# Patient Record
Sex: MALE | Race: WHITE | URBAN - METROPOLITAN AREA
[De-identification: names, ages, dates, MRNs, and addresses within clinical notes are randomized per-mention and may not be internally consistent; named-entity substitution may affect disease eponyms.]

---

## 2017-08-25 ENCOUNTER — TRANSFERRED RECORDS (OUTPATIENT)
Dept: HEALTH INFORMATION MANAGEMENT | Facility: CLINIC | Age: 82
End: 2017-08-25

## 2017-08-25 ENCOUNTER — TELEPHONE (OUTPATIENT)
Dept: OPHTHALMOLOGY | Facility: CLINIC | Age: 82
End: 2017-08-25

## 2017-08-25 NOTE — TELEPHONE ENCOUNTER
Dr. Vann referral for possible gas bubble/TPA procedure for macular hemorrhage in next 1-2 weeks  Notes been faxed    Pt visiting in [place] today, but lives closer to [place] now  Able to schedule an evaluation next Thursday with dr. Bourgeois    Left message to confirm appt with direct triage number  Leopoldo Blanchard RN 12:27 PM 08/25/17

## 2017-08-25 NOTE — TELEPHONE ENCOUNTER
----- Message from Denver Roberts sent at 8/25/2017  9:46 AM CDT -----  Regarding: macular hemmorrhage  Carin smith pt was referred by Dr Maverick Vann to Dr FIONA Bourgeois for macular hemorrhage in 1-2 weeks. Can you call Dora at [phone] or just call the pt to sched and then let dora know the date/time.    Thanks  Denver

## 2017-08-31 ENCOUNTER — OFFICE VISIT (OUTPATIENT)
Dept: OPHTHALMOLOGY | Facility: CLINIC | Age: 82
End: 2017-08-31
Attending: OPHTHALMOLOGY
Payer: MEDICARE

## 2017-08-31 DIAGNOSIS — H35.3210 EXUDATIVE AGE-RELATED MACULAR DEGENERATION, RIGHT EYE, STAGE UNSPECIFIED (H): ICD-10-CM

## 2017-08-31 DIAGNOSIS — H35.61 RETINAL HEMORRHAGE, RIGHT: Primary | ICD-10-CM

## 2017-08-31 PROCEDURE — 99215 OFFICE O/P EST HI 40 MIN: CPT | Mod: ZF

## 2017-08-31 PROCEDURE — 92134 CPTRZ OPH DX IMG PST SGM RTA: CPT | Mod: ZF | Performed by: OPHTHALMOLOGY

## 2017-08-31 PROCEDURE — 92250 FUNDUS PHOTOGRAPHY W/I&R: CPT | Mod: ZF | Performed by: OPHTHALMOLOGY

## 2017-08-31 PROCEDURE — 92240 ICG ANGIOGRAPHY I&R UNI/BI: CPT | Mod: ZF | Performed by: OPHTHALMOLOGY

## 2017-08-31 PROCEDURE — 92235 FLUORESCEIN ANGRPH MLTIFRAME: CPT | Mod: ZF | Performed by: OPHTHALMOLOGY

## 2017-08-31 RX ORDER — HYDROCHLOROTHIAZIDE 25 MG/1
25 TABLET ORAL DAILY
COMMUNITY

## 2017-08-31 ASSESSMENT — TONOMETRY
OS_IOP_MMHG: 17
IOP_METHOD: TONOPEN
OD_IOP_MMHG: 13

## 2017-08-31 ASSESSMENT — REFRACTION_MANIFEST
OS_ADD: +2.75
OD_AXIS: 003
OD_SPHERE: PLANO
OS_SPHERE: PLANO
OD_CYLINDER: +0.50
OS_CYLINDER: SPHERE
OD_ADD: +2.75

## 2017-08-31 ASSESSMENT — CONF VISUAL FIELD
OD_NORMAL: 1
OS_NORMAL: 1

## 2017-08-31 ASSESSMENT — SLIT LAMP EXAM - LIDS
COMMENTS: NORMAL
COMMENTS: NORMAL

## 2017-08-31 ASSESSMENT — VISUAL ACUITY
METHOD: SNELLEN - LINEAR
CORRECTION_TYPE: GLASSES
OS_CC: 20/30
OS_CC+: -2/+2

## 2017-08-31 ASSESSMENT — EXTERNAL EXAM - RIGHT EYE: OD_EXAM: NORMAL

## 2017-08-31 ASSESSMENT — CUP TO DISC RATIO
OS_RATIO: 0.7
OD_RATIO: 0.7

## 2017-08-31 ASSESSMENT — EXTERNAL EXAM - LEFT EYE: OS_EXAM: NORMAL

## 2017-08-31 NOTE — MR AVS SNAPSHOT
After Visit Summary   8/31/2017    Aurelio Graf    MRN: [id number]           Patient Information     Date Of Birth          9/27/1934        Visit Information        Provider Department      8/31/2017 8:15 AM Kanika Bourgeois MD [facility]        Today's Diagnoses     Retinal hemorrhage, right    -  1    Exudative age-related macular degeneration, right eye, stage unspecified (H)           Follow-ups after your visit        Your next 10 appointments already scheduled     Sep 05, 2017   Procedure with Kanika Bourgeois MD   [facility] (--)    [address]  [facility] [address]   [phone]            Sep 06, 2017  1:00 PM CDT   Post-Op with Sathya Nj MD   Eye Clinic ([facility])    [address]  [address]  [facility] [address] [phone]            Sep 14, 2017  1:15 PM CDT   Post-Op with Kanika Bourgeois MD   Eye Clinic ([facility])    [address]  [address]  [facility] [address] [phone]              Who to contact     Please call your clinic at [phone] to:    Ask questions about your health    Make or cancel appointments    Discuss your medicines    Learn about your test results    Speak to your doctor   If you have compliments or concerns about an experience at your clinic, or if you wish to file a complaint, please contact [facility] Patient Relations at [phone] or email us at [email or web address]         Additional Information About Your Visit        Care EveryWhere ID     This is your Care EveryWhere ID. This could be used by other organizations to access your [facility] medical records  [id number]         Blood Pressure from Last 3 Encounters:   No data found for BP    Weight from Last 3 Encounters:   No data found for Wt              We Performed the Following     Fluorescein  Angiography OU (both eyes)     Fundus Autofluorescence Image (FAF) OU (both eyes)     Fundus Photos OU (both eyes)     ICG Angiography OU (both eyes)     OCT Retina Spectralis OU (both eyes)     Valeri-Operative Worksheet (Retina)        Primary Care Provider Office Phone # Fax #    Anibal Neri [phone]       [address] [address]        Equal Access to Services     SMOOTH BADILLO : Hadii aad ku hadasho Soomaali, waaxda luqadaha, qaybta kaalmada adeegyada, waxay idiin hayaan adeeg khmiguesh la'christophern . So [facility] [phone].    ATENCIÓN: Si habla espbaljit, tiene a pena disposición servicios gratuitos de asistencia lingüística. Llame al [phone].    We comply with applicable federal civil rights laws and [place] laws. We do not discriminate on the basis of race, color, national origin, age, disability sex, sexual orientation or gender identity.            Thank you!     Thank you for choosing EYE CLINIC  for your care. Our goal is always to provide you with excellent care. Hearing back from our patients is one way we can continue to improve our services. Please take a few minutes to complete the written survey that you may receive in the mail after your visit with us. Thank you!             Your Updated Medication List - Protect others around you: Learn how to safely use, store and throw away your medicines at www.disposemymeds.org.          This list is accurate as of: 8/31/17 10:33 AM.  Always use your most recent med list.                   Brand Name Dispense Instructions for use Diagnosis    AMLODIPINE BESYLATE PO      Take 5 mg by mouth At Bedtime        ASPIRIN PO      Take 81 mg by mouth daily        ATORVASTATIN CALCIUM PO      Take 20 mg by mouth At Bedtime        hydrochlorothiazide 25 MG tablet    HYDRODIURIL     Take 25 mg by mouth daily        PRESERVISION AREDS PO      Take 1 capsule by mouth 2 times daily

## 2017-08-31 NOTE — PROGRESS NOTES
CC -   Retinal hemorrhage    INTERVAL HISTORY - Initial visit    HPI -   Aurelio Graf is a  82 year old year-old patient presenting from Dr. Maverick Vann for foveal hemorrhage. He has a history of ARMD on AREDS, cataracts. He was at his cabin near [place] and last Tuesday when he noticed decreased vision while watching TV. On Wednesday, he covered his left eye and noticed very blurry vision in his right eye. He was seen in the ED in [facility] where a stroke workup was negative. He was seen by Belkis Garcia and Edwar in [place].     PAST OCULAR SURGERY  None    RETINAL IMAGING:  OCT 8/31/17  OD - large subretinal fluid, heme and sub rpe fluid, heme, drusen  OS - many scattered drusen,no srf/irf    FA 8/31/17  OD - large area of blocking involving fovea and extending beyond inferior arcade, pinpoint area of leakage inferiorly  OS - normal AV and choroidal filling. early hypofluorescence of drusen with late staining    ICG 8/31/17  OD - blockage large area of blocking involving fovea and extending beyond inferior arcade, pinpoint area of leakage inferiorly  OS -. hypofluorescence of drusen     Color optos photos 8/31/17  OD - large subretinal macular hemorrhage involving fovea and inferiorly extending beyond arcade  OS - drusen throughout macula, nasal PPA, temporal pigmentation in periphery      ASSESSMENT & PLAN  1) Retinal hemorrhage, right eye   -status post Avastin x 1 8/25/17 by Dr. 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 Patient agreed to proceed with surgery.    2) ARMD, both eyes   -currently on AREDS   -healthy diet, no smoking   -Amsler grid    3) Cataracts bilateral   -observe for now   -likely visually significant    4) pseudoexfoliation, left eye   -IOP wnl   -given appearance of C/D ratio, may need baseline testing for glaucoma    return to clinic: Postoperative day 1     ~~~~~~~~~~~~~~~~~~~~~~~~~~~~~~~~~~   Complete documentation of historical and exam elements from today's encounter can be found in the full encounter summary report (not reduplicated in this progress note).  I personally obtained the chief complaint(s) and history of present illness.  I confirmed and edited as necessary the review of systems, past medical/surgical history, family history, social history, and examination findings as documented by others; and I examined the patient myself.  I personally reviewed the relevant tests, images, and reports as documented above.  I formulated and edited as necessary the assessment and plan and discussed the findings and management plan with the patient and family    Kanika Bourgeois MD  .  Retina Service   Department of Ophthalmology and Visual Neurosciences   [facility]  Phone: [phone]   Fax: [phone]

## 2017-08-31 NOTE — LETTER
8/31/2017        RE: Aurelio Graf  [address]  [facility] [address]     Dear Chris,    Thank you for referring your patient, Aurelio Graf, to the EYE CLINIC at [facility]. Please see a copy of my visit note below.    CC -   Retinal hemorrhage    INTERVAL HISTORY - Initial visit    HPI -   Aurelio Graf is a  82 year old year-old patient presenting from Dr. Maverick Vann for foveal hemorrhage. He has a history of ARMD on AREDS, cataracts. He was at his cabin near [place] and last Tuesday when he noticed decreased vision while watching TV. On Wednesday, he covered his left eye and noticed very blurry vision in his right eye. He was seen in the ED in [facility] where a stroke workup was negative. He was seen by Belkis Garcia and Edwar in [place].     PAST OCULAR SURGERY  None    RETINAL IMAGING:  OCT 8/31/17  OD - large subretinal fluid, heme and sub rpe fluid, heme, drusen  OS - many scattered drusen,no srf/irf    FA 8/31/17  OD - large area of blocking involving fovea and extending beyond inferior arcade, pinpoint area of leakage inferiorly  OS - normal AV and choroidal filling. early hypofluorescence of drusen with late staining    ICG 8/31/17  OD - blockage large area of blocking involving fovea and extending beyond inferior arcade, pinpoint area of leakage inferiorly  OS -. hypofluorescence of drusen     Color optos photos 8/31/17  OD - large subretinal macular hemorrhage involving fovea and inferiorly extending beyond arcade  OS - drusen throughout macula, nasal PPA, temporal pigmentation in periphery      ASSESSMENT & PLAN  1) Retinal hemorrhage, right eye   -status post Avastin x 1 8/25/17 by Dr. 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 Patient agreed to proceed with surgery.    2) ARMD, both eyes   -currently on AREDS   -healthy diet, no smoking   -Amsler grid    3) Cataracts bilateral   -observe for now   -likely visually significant    4) pseudoexfoliation, left eye   -IOP wnl   -given appearance of C/D ratio, may need baseline testing for glaucoma    return to clinic: Postoperative day 1     Again, thank you for allowing me to participate in the care of your patient.      Sincerely,    MD Kanika Marinelli MD  .  Retina Service   Department of Ophthalmology and Visual Neurosciences   [facility]  Phone: [phone]   Fax: [phone]

## 2017-08-31 NOTE — NURSING NOTE
Chief Complaints and History of Present Illnesses   Patient presents with     Retinal Hemorrhage Evaluation     macular hemorrhage right eye      HPI    Affected eye(s):  Right   Symptoms:     Decreased vision      Duration:  9 days   Frequency:  Constant       Do you have eye pain now?:  No      Comments:  Right eye macular hemorrhage referral  Noticed change Tuesday last week (8-22-17)  No eye pain  No floater/flashing prior  Leopoldo Blanchard RN 7:55 AM 08/31/17

## 2017-09-01 RX ORDER — AMOXICILLIN 500 MG
CAPSULE ORAL
COMMUNITY

## 2017-09-05 ENCOUNTER — HOSPITAL ENCOUNTER (OUTPATIENT)
Facility: CLINIC | Age: 82
Discharge: HOME | End: 2017-09-05
Attending: OPHTHALMOLOGY | Admitting: OPHTHALMOLOGY
Payer: MEDICARE

## 2017-09-05 ENCOUNTER — SURGERY (OUTPATIENT)
Age: 82
End: 2017-09-05

## 2017-09-05 ENCOUNTER — ANESTHESIA (OUTPATIENT)
Dept: SURGERY | Facility: CLINIC | Age: 82
End: 2017-09-05
Payer: MEDICARE

## 2017-09-05 ENCOUNTER — ANESTHESIA EVENT (OUTPATIENT)
Dept: SURGERY | Facility: CLINIC | Age: 82
End: 2017-09-05
Payer: MEDICARE

## 2017-09-05 VITALS
TEMPERATURE: 97.2 F | WEIGHT: 174 LBS | HEART RATE: 62 BPM | SYSTOLIC BLOOD PRESSURE: 147 MMHG | HEIGHT: 70 IN | OXYGEN SATURATION: 98 % | BODY MASS INDEX: 24.91 KG/M2 | DIASTOLIC BLOOD PRESSURE: 68 MMHG | RESPIRATION RATE: 16 BRPM

## 2017-09-05 DIAGNOSIS — H35.61 SUBRETINAL HEMORRHAGE OF RIGHT EYE: Primary | ICD-10-CM

## 2017-09-05 PROCEDURE — 25000125 ZZHC RX 250: Performed by: OPHTHALMOLOGY

## 2017-09-05 PROCEDURE — 36000105 ZZH EYE SURGERY LEVEL 4 EA 15 ADDTL MIN: Performed by: OPHTHALMOLOGY

## 2017-09-05 PROCEDURE — 25000125 ZZHC RX 250: Performed by: NURSE ANESTHETIST, CERTIFIED REGISTERED

## 2017-09-05 PROCEDURE — 25000125 ZZHC RX 250: Performed by: ANESTHESIOLOGY

## 2017-09-05 PROCEDURE — 25000128 H RX IP 250 OP 636: Performed by: NURSE ANESTHETIST, CERTIFIED REGISTERED

## 2017-09-05 PROCEDURE — 36000104 ZZH EYE SURGERY LEVEL 4 1ST 30 MIN: Performed by: OPHTHALMOLOGY

## 2017-09-05 PROCEDURE — 71000028 ZZH EYE RECOVERY PHASE 2 EACH 15 MINS: Performed by: OPHTHALMOLOGY

## 2017-09-05 PROCEDURE — 37000009 ZZH ANESTHESIA TECHNICAL FEE, EACH ADDTL 15 MIN: Performed by: OPHTHALMOLOGY

## 2017-09-05 PROCEDURE — 27210995 ZZH RX 272: Performed by: OPHTHALMOLOGY

## 2017-09-05 PROCEDURE — 27211046 ZZH EYE GAS ISPAN SF6: Performed by: OPHTHALMOLOGY

## 2017-09-05 PROCEDURE — 25000128 H RX IP 250 OP 636: Performed by: OPHTHALMOLOGY

## 2017-09-05 PROCEDURE — 27210794 ZZH OR GENERAL SUPPLY STERILE: Performed by: OPHTHALMOLOGY

## 2017-09-05 PROCEDURE — 40000170 ZZH STATISTIC PRE-PROCEDURE ASSESSMENT II: Performed by: OPHTHALMOLOGY

## 2017-09-05 PROCEDURE — S0020 INJECTION, BUPIVICAINE HYDRO: HCPCS | Performed by: OPHTHALMOLOGY

## 2017-09-05 PROCEDURE — 25000128 H RX IP 250 OP 636: Performed by: ANESTHESIOLOGY

## 2017-09-05 PROCEDURE — 37000008 ZZH ANESTHESIA TECHNICAL FEE, 1ST 30 MIN: Performed by: OPHTHALMOLOGY

## 2017-09-05 RX ORDER — ONDANSETRON 2 MG/ML
INJECTION INTRAMUSCULAR; INTRAVENOUS PRN
Status: DISCONTINUED | OUTPATIENT
Start: 2017-09-05 | End: 2017-09-05

## 2017-09-05 RX ORDER — BALANCED SALT SOLUTION 6.4; .75; .48; .3; 3.9; 1.7 MG/ML; MG/ML; MG/ML; MG/ML; MG/ML; MG/ML
SOLUTION OPHTHALMIC PRN
Status: DISCONTINUED | OUTPATIENT
Start: 2017-09-05 | End: 2017-09-05 | Stop reason: HOSPADM

## 2017-09-05 RX ORDER — PROPOFOL 10 MG/ML
INJECTION, EMULSION INTRAVENOUS PRN
Status: DISCONTINUED | OUTPATIENT
Start: 2017-09-05 | End: 2017-09-05

## 2017-09-05 RX ORDER — PHENYLEPHRINE HYDROCHLORIDE 25 MG/ML
1 SOLUTION/ DROPS OPHTHALMIC
Status: COMPLETED | OUTPATIENT
Start: 2017-09-05 | End: 2017-09-05

## 2017-09-05 RX ORDER — ATROPINE SULFATE 10 MG/ML
SOLUTION/ DROPS OPHTHALMIC PRN
Status: DISCONTINUED | OUTPATIENT
Start: 2017-09-05 | End: 2017-09-05 | Stop reason: HOSPADM

## 2017-09-05 RX ORDER — CYCLOPENTOLATE HYDROCHLORIDE 10 MG/ML
1 SOLUTION/ DROPS OPHTHALMIC
Status: COMPLETED | OUTPATIENT
Start: 2017-09-05 | End: 2017-09-05

## 2017-09-05 RX ORDER — DEXAMETHASONE SODIUM PHOSPHATE 4 MG/ML
INJECTION, SOLUTION INTRA-ARTICULAR; INTRALESIONAL; INTRAMUSCULAR; INTRAVENOUS; SOFT TISSUE PRN
Status: DISCONTINUED | OUTPATIENT
Start: 2017-09-05 | End: 2017-09-05 | Stop reason: HOSPADM

## 2017-09-05 RX ORDER — NEOMYCIN POLYMYXIN B SULFATES AND DEXAMETHASONE 3.5; 10000; 1 MG/ML; [USP'U]/ML; MG/ML
1 SUSPENSION/ DROPS OPHTHALMIC 4 TIMES DAILY
Qty: 5 ML | Refills: 0 | Status: SHIPPED | OUTPATIENT
Start: 2017-09-05 | End: 2018-01-04

## 2017-09-05 RX ORDER — SODIUM CHLORIDE, SODIUM LACTATE, POTASSIUM CHLORIDE, CALCIUM CHLORIDE 600; 310; 30; 20 MG/100ML; MG/100ML; MG/100ML; MG/100ML
INJECTION, SOLUTION INTRAVENOUS CONTINUOUS
Status: DISCONTINUED | OUTPATIENT
Start: 2017-09-05 | End: 2017-09-05 | Stop reason: HOSPADM

## 2017-09-05 RX ORDER — TROPICAMIDE 10 MG/ML
1 SOLUTION/ DROPS OPHTHALMIC
Status: COMPLETED | OUTPATIENT
Start: 2017-09-05 | End: 2017-09-05

## 2017-09-05 RX ORDER — FENTANYL CITRATE 50 UG/ML
INJECTION, SOLUTION INTRAMUSCULAR; INTRAVENOUS PRN
Status: DISCONTINUED | OUTPATIENT
Start: 2017-09-05 | End: 2017-09-05

## 2017-09-05 RX ORDER — NEOMYCIN SULFATE, POLYMYXIN B SULFATE, AND DEXAMETHASONE 3.5; 10000; 1 MG/G; [USP'U]/G; MG/G
OINTMENT OPHTHALMIC PRN
Status: DISCONTINUED | OUTPATIENT
Start: 2017-09-05 | End: 2017-09-05 | Stop reason: HOSPADM

## 2017-09-05 RX ORDER — TETRACAINE HYDROCHLORIDE 5 MG/ML
SOLUTION OPHTHALMIC PRN
Status: DISCONTINUED | OUTPATIENT
Start: 2017-09-05 | End: 2017-09-05 | Stop reason: HOSPADM

## 2017-09-05 RX ADMIN — ONDANSETRON 4 MG: 2 INJECTION INTRAMUSCULAR; INTRAVENOUS at 10:32

## 2017-09-05 RX ADMIN — TROPICAMIDE 1 DROP: 10 SOLUTION/ DROPS OPHTHALMIC at 07:58

## 2017-09-05 RX ADMIN — NEOMYCIN SULFATE, POLYMYXIN B SULFATE, AND DEXAMETHASONE 0.5 G: 3.5; 10000; 1 OINTMENT OPHTHALMIC at 10:36

## 2017-09-05 RX ADMIN — FENTANYL CITRATE 50 MCG: 50 INJECTION, SOLUTION INTRAMUSCULAR; INTRAVENOUS at 09:54

## 2017-09-05 RX ADMIN — LIDOCAINE HYDROCHLORIDE,EPINEPHRINE BITARTRATE 7 ML GIVEN: 20; .005 INJECTION, SOLUTION EPIDURAL; INFILTRATION; INTRACAUDAL; PERINEURAL at 10:15

## 2017-09-05 RX ADMIN — CYCLOPENTOLATE HYDROCHLORIDE 1 DROP: 10 SOLUTION/ DROPS OPHTHALMIC at 07:42

## 2017-09-05 RX ADMIN — VANCOMYCIN HYDROCHLORIDE 0.5 ML: 500 INJECTION, POWDER, LYOPHILIZED, FOR SOLUTION INTRAVENOUS at 10:31

## 2017-09-05 RX ADMIN — DEXAMETHASONE SODIUM PHOSPHATE 2 MG: 4 INJECTION, SOLUTION INTRA-ARTICULAR; INTRALESIONAL; INTRAMUSCULAR; INTRAVENOUS; SOFT TISSUE at 10:30

## 2017-09-05 RX ADMIN — TROPICAMIDE 1 DROP: 10 SOLUTION/ DROPS OPHTHALMIC at 07:49

## 2017-09-05 RX ADMIN — BALANCED SALT SOLUTION 0.5 ML: 6.4; .75; .48; .3; 3.9; 1.7 SOLUTION OPHTHALMIC at 10:16

## 2017-09-05 RX ADMIN — PHENYLEPHRINE HYDROCHLORIDE 1 DROP: 2.5 SOLUTION/ DROPS OPHTHALMIC at 07:42

## 2017-09-05 RX ADMIN — CYCLOPENTOLATE HYDROCHLORIDE 1 DROP: 10 SOLUTION/ DROPS OPHTHALMIC at 07:49

## 2017-09-05 RX ADMIN — CYCLOPENTOLATE HYDROCHLORIDE 1 DROP: 10 SOLUTION/ DROPS OPHTHALMIC at 07:58

## 2017-09-05 RX ADMIN — TROPICAMIDE 1 DROP: 10 SOLUTION/ DROPS OPHTHALMIC at 07:42

## 2017-09-05 RX ADMIN — LIDOCAINE HYDROCHLORIDE 1 ML: 10 INJECTION, SOLUTION EPIDURAL; INFILTRATION; INTRACAUDAL; PERINEURAL at 08:01

## 2017-09-05 RX ADMIN — SODIUM CHLORIDE, POTASSIUM CHLORIDE, SODIUM LACTATE AND CALCIUM CHLORIDE: 600; 310; 30; 20 INJECTION, SOLUTION INTRAVENOUS at 08:01

## 2017-09-05 RX ADMIN — PROPOFOL 20 MG: 10 INJECTION, EMULSION INTRAVENOUS at 09:54

## 2017-09-05 RX ADMIN — PHENYLEPHRINE HYDROCHLORIDE 1 DROP: 2.5 SOLUTION/ DROPS OPHTHALMIC at 07:58

## 2017-09-05 RX ADMIN — ATROPINE SULFATE 1 DROP: 10 SOLUTION OPHTHALMIC at 10:30

## 2017-09-05 RX ADMIN — DEXMEDETOMIDINE HYDROCHLORIDE 8 MCG: 100 INJECTION, SOLUTION INTRAVENOUS at 09:52

## 2017-09-05 RX ADMIN — BALANCED SALT SOLUTION 15 ML: 6.4; .75; .48; .3; 3.9; 1.7 SOLUTION OPHTHALMIC at 10:13

## 2017-09-05 RX ADMIN — EPINEPHRINE 500 ML: 1 INJECTION, SOLUTION, CONCENTRATE INTRAVENOUS at 10:15

## 2017-09-05 RX ADMIN — TETRACAINE HYDROCHLORIDE 1 DROP: 5 SOLUTION OPHTHALMIC at 10:15

## 2017-09-05 RX ADMIN — Medication 5.5 MG: at 10:15

## 2017-09-05 RX ADMIN — PHENYLEPHRINE HYDROCHLORIDE 1 DROP: 2.5 SOLUTION/ DROPS OPHTHALMIC at 07:48

## 2017-09-05 RX ADMIN — ALTEPLASE 0.1 ML: 2.2 INJECTION, POWDER, LYOPHILIZED, FOR SOLUTION INTRAVENOUS at 10:30

## 2017-09-05 ASSESSMENT — LIFESTYLE VARIABLES: TOBACCO_USE: 0

## 2017-09-05 ASSESSMENT — COPD QUESTIONNAIRES: COPD: 0

## 2017-09-05 NOTE — ANESTHESIA PREPROCEDURE EVALUATION
Anesthesia Evaluation     . Pt has had prior anesthetic. Type: General    No history of anesthetic complications          ROS/MED HX    ENT/Pulmonary:     (+)sleep apnea, doesn't use CPAP , . .   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 : . . . :. .       METS/Exercise Tolerance:     Hematologic:         Musculoskeletal:         GI/Hepatic:     (+) GERD      (-) liver disease   Renal/Genitourinary:     (+) chronic renal disease, type: CRI,       Endo:      (-) Type I DM and Type II DM   Psychiatric:         Infectious Disease:         Malignancy:         Other:                     Physical Exam  Normal systems: pulmonary    Airway   Mallampati: I  TM distance: >3 FB  Neck ROM: full    Dental   (+) missing    Cardiovascular   Rhythm and rate: regular and normal      Pulmonary                     Anesthesia Plan      History & Physical Review  History and physical reviewed and following examination; no interval change.    ASA Status:  3 .    NPO Status:  > 8 hours    Plan for MAC Reason for MAC:  Procedure to face, neck, head or breast  PONV prophylaxis:  Ondansetron (or other 5HT-3)       Postoperative Care  Postoperative pain management:  Multi-modal analgesia.      Consents  Anesthetic plan, risks, benefits and alternatives discussed with:  Patient..                          .

## 2017-09-05 NOTE — ANESTHESIA POSTPROCEDURE EVALUATION
Patient: Aurelio Graf    Procedure(s):  RIGHT EYE VITRECTOMY PARSPLANA WITH 25 GAUGE SYSTEM FLUID GAS EXCHANGE,  MEMBRANE PEEL, ALTEPLASE INTRAVITREAL INJECTION , INFUSION OF 20% SF6 - Wound Class: I-Clean    Diagnosis:subretinal hemorrhage  Diagnosis Additional Information: No value filed.    Anesthesia Type:  MAC    Note:  Anesthesia Post Evaluation    Patient location during evaluation: PACU  Patient participation: Able to fully participate in evaluation  Level of consciousness: awake  Pain management: adequate  Airway patency: patent  Cardiovascular status: acceptable  Respiratory status: acceptable  Hydration status: acceptable  PONV: controlled     Anesthetic complications: None          Last vitals:  Vitals:    09/05/17 1039 09/05/17 1057 09/05/17 1128   BP: 138/64 147/68 147/68   Pulse:      Resp: 16  16   Temp:      SpO2: 99% 99% 98%         Electronically Signed By: Shawn Smith MD  September 5, 2017  2:02 PM

## 2017-09-05 NOTE — DISCHARGE INSTRUCTIONS
[facility] Anesthesia [facility] Discharge  Instructions  Anesthesia (Eye Care Center)   Adult Discharge Instructions    For 24 hours after surgery    1. Get plenty of rest.  Make arrangements to have a responsible adult stay with you for at least 6 hours after you leave the hospital.  2. Do not drive or use heavy equipment for 24 hours.    3. Do not drink alcohol for 24 hours.  4. Do not sign legal documents or make important decisions for 24 hours.  5. Avoid strenuous or risky activities. You may feel lightheaded.  If so, sit for a few minutes before standing.  Have someone help you get up.   6. Conscious sedation patients may resume a regular diet..  7. Any questions of medical nature, call your physician.    [facility]  Discharge Instruction    EyeSurgery [facility]    MD Kanika Moreau MD Joseph Terry, MD  Will I have pain?  Some discomfort is normal and expected following surgery. The first few days after surgery you may need to use prescription pain pills. Taking Tylenol (acetaminophen) regularly may help prevent pain.  Discomfort should gradually decrease and Tylenol should be sufficient to relieve pain. A foreign body sensation in the cornea of the eye is very common and caused by sutures placed at surgery. These sutures will go away in one to two weeks. If the pain worsens, you should call the doctor.  Do I need to wear an eye patch?  You do not need to wear an eye patch at home after the doctor has removed the patch on your first day after surgery. However, you may be more comfortable wearing a patch outside in the sun, when sleeping or napping, or in a armando, windy environment.  How much drainage should I have?  You may expect a moderate amount of drainage for a week. Gradually the drainage should decrease. The lids can be cleaned with a clean washcloth and gentle soap or diluted baby shampoo. Wipe the eyelids gently from the nose outward.  Some blood in the tears is a normal finding.  Will there be swelling?  Some swelling is normal for about a week or two after which it will gradually decrease. Applying a cool compress, using a clean washcloth for 5 - 10 minutes several times a day may reduce the swelling and make you more comfortable. People may have some swelling of both eyes, especially if face down positioning is required. The white part of the eye may appear very red or bloody for a week or two. This may get worse a few days after surgery. Though the bright red appearance can look frightening, it is a normal finding early after surgery and will resolve in a few weeks.  Will I need to use eye drops?  You will be using several different kinds of eye drops or ointment (salve) when you leave the hospital. The directions will be on each bottle or tube. The medication with the red top will keep your eye dilated and may make your eye more sensitive to light. Wearing sunglasses may help. The other medication is a combination antibiotic/steroid to prevent infection and promote healing.  Occasionally a third drop is used to control the pressure in your eye. A new bottle of artificial tears or lubricant ointment may be used along with your prescription eye drops after surgery. You will be using drops from four to eight weeks. Bring all eye medications (drops. ointments, or pills) with you  to each visit.   Always wash your hands before putting in the eye drops. You may wish to have someone else help you. Pull down on the lower lid and squeeze one drop from the bottle being careful not to touch the dropper to your eye or eyelid. One drop is sufficient, but another may be used if the first did not go into the eye. It is often easier to put in the drops if you are reclining or lying down. Wait five (5) minutes after the first drop before using the second drop to allow the medications to absorb into the eye.  How long will it take for my vision to  improve?  Your vision should gradually improve, but it may take up to six months to regain your best vision. Frequently, air or gas bubbles are injected into the eye at the time of surgery. This will blur your vision significantly at first. As the bubble becomes smaller it will cause a black line in your vision that moves as you move your head. As the bubble becomes smaller you may notice that it looks more like a bubble or that it will break up into several smaller bubbles. It will take from a few days to a few weeks for the bubble to dissolve and be replaced by clear fluid.  You may notice floaters or double vision after your surgery. These symptoms usually will decrease with time. If the double vision is bothersome patching the eye may help.  If you notice a sudden worsening in your vision call your doctor.  Are there any physical restrictions after surgery?  If an air or gas bubble was placed in the eye during surgery, you will be asked to spend most of your time (both awake and during the night) with your head in a specific position, frequently face down.Your position is face down and sleep face down. As the eye heals and the bubble dissolves there will be less of a need for you to stay in that specific position. You should avoid sleeping on your back until the bubble has totally dissolved and you have been given permission from your surgeon. You should not fly in an airplane or go to high altitudes in the mountains while there is a bubble in your eye. If you should require any other surgery, under general anesthesia, while you still have an air bubble in your eye, have your surgeon or anesthetist contact us prior to your surgery. Some anesthetic agents can make the bubble expand and seriously damage your eye.  Patients that have a gas/air bubble placed in their eye will have a green medical alert band on their wrist.  This band should not be removed until the doctor removes it for you or gives you permission  to remove it.     Heavy lifting (greater than 50 pounds), swimming and contact sports should be avoided for about 3 to 4 weeks after surgery.  You may resume your usual sexual activities about one week after surgery.  When may I return to work or my normal activities?  Depending on the type or work, you may return to work within a few days. If your work involves physical activity or driving, you will need to restrict your activities and remain home longer.  You may watch television, look at magazines, or work puzzles. Reading may be uncomfortable for several days, but using the eyes will not cause any damage.  You may go outside as usual. If conditions are windy or armando, wear an eye pad to avoid getting dust or dirt in the eye.  Can I travel?  You cannot fly in an airplane or drive into the mountains as long as the air or gas bubble remains in your eye.    Are there any driving restrictions?  Someone will need to drive you home from the hospital. Generally driving can be resumed in several days if you have good vision in your other eye. If you do not feel comfortable driving, do not drive! Your depth perception will be decreased so you will want to try driving during the day in light traffic until you feel comfortable driving. You should restrict your driving while you are taking prescription pain pills as they also can affect your judgment.  When can I shower and wash my hair?  You may shower or bathe when you get home, but avoid getting water in your eye. You may want someone to help you shampoo your hair at first.  You may shave, brush your teeth, or comb your hair. Do not use make-up, mascara, or creams/lotions around your eye for several weeks.  When will I see the doctor again?  Generally, you will be seen the first day after surgery and again 1-2 weeks later. If you have not received a return appointment before leaving the hospital, you should call our office during the business hours to arrange an  appointment. If you will be seeing your local doctor instead of us, you will need to call that office to set up an appointment.  How do I reach a doctor if I have concerns?  One of our doctors is available by calling [facility] [phone]. Please try to call for routine questions and prescription refills during business hours.  You should call your doctor if:  You notice a sudden decrease in your vision.  Have severe pain or pain increases rather than subsiding.    You notice a new black curtain over your eye that is not the gas bubble. If you have any of these symptoms, you may need to be examined.        2/14/14

## 2017-09-05 NOTE — ANESTHESIA CARE TRANSFER NOTE
Patient: Aurelio Graf    Procedure(s):  RIGHT EYE VITRECTOMY PARSPLANA WITH 25 GAUGE SYSTEM FLUID GAS EXCHANGE,  MEMBRANE PEEL, ALTEPLASE INTRAVITREAL INJECTION , INFUSION OF 20% SF6 - Wound Class: I-Clean    Diagnosis: subretinal hemorrhage  Diagnosis Additional Information: No value filed.    Anesthesia Type:   MAC     Note:  Airway :Room Air  Patient transferred to:PACU        Vitals: (Last set prior to Anesthesia Care Transfer)    CRNA VITALS  9/5/2017 1002 - 9/5/2017 1038      9/5/2017             Pulse: 52    Ht Rate: 50    SpO2: 100 %    Resp Rate (set): 10                Electronically Signed By: TOBY Mccracken CRNA  September 5, 2017  10:38 AM

## 2017-09-05 NOTE — OP NOTE
SURGEON: MAURICIO GERARDO MD  ASSISTANT:  Sathya Nj MD  PREOPERATIVE DIAGNOSIS:  Subretinal hemorrhage, right eye  POSTOPERATIVE DIAGNOSIS: same   SURGERY    25 gauge pars plana vitectomy, subretinal and intravitreal TPA (Tissue plasminogen activator), air fluid exchange, 20% SF6.  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 He is here for surgical repair.  DESCRIPTION OF THE PROCEDURE:  The patient was taken to the operating room where intravenous sedation was administered by the anesthesia department and a retrobulbar block consisting of a 1:1 mixture of 2%lidocaine and 0.75% marcaine with epinephrine and wydase, was administered to the operative eye with adequate anesthesia and akinesia.      The eye was prepped and draped in the usual sterile surgical fashion for ophthalmic surgery, including the installation of one drop of 5% Povidone Iodine.  A sterile drape was placed over the face and body and a lid speculum was inserted.      A 25 gauge infusion cannula was placed 3.5 mm posterior to the limbus inferotemporally. The infusion cannula was connected and its intravitreal location was verified by direct visualization. The infusion was turned on.  Two other 25 gauge trocars were placed 3.5 mm posterior to the limbus at 10:00 o'clock and 2:00 o'clock position. The vitrectomy handpiece and endoilluminator were placed in the eye.  Upon entering the eye, it was noted the pt had a large macula subretinal hemorrhage. A vitrectomy was performed. Next approx 0.1 ml of kenalog was injected. Careful peripheral vitrectomy was performed with scleral depression 360 degrees. Next, using a subretinal needle approx 0.04 ml of  Tissue plasminogen activator (25 micrograms/0.1 ml) was injected into the vitreous cavity     Next, an air  fluid exchange was performed. An air-gas exchange (with 20%SF6) was performed. 0.06 ml of  Tissue plasminogen activator (25 micrograms/ml) was injected into the vitreous cavity. Next, the trocars and the infusion line were removed. The sclerotomies were airtight and no sutures were required. Subconjunctival injections of ancef and dexamethasone were administed The lid speculum was removed. The IOP was approximately 20 at the end of the case. The eye was cleaned with wet and dry gauze. Maxitrol ointment and Atropine drops were placed on the eye.  A patch and Guidry shield were placed over the eye.     The patient tolerated well the procedure and was transferred to the PACU in stable condition.  Dr. Nj was the surgical assistant as the complexity of the case required a high skill assistant surgeon and/or there was not resident available

## 2017-09-05 NOTE — BRIEF OP NOTE
Surgeon: Kanika Bourgeois M.D  Assistant surgeon:  Sathya Nj MD   Pre-operative diagnosis:   Post-operative diagnosis: same  Surgery:  25 gauge pars plana vitectomy,  Subretinal and intravitreal TPA injection, air-fluid exchange, gas SF6 20%   Complications: none  Anesthesia: MAC and peribulbar block   Blood loss: Scant

## 2017-09-05 NOTE — IP AVS SNAPSHOT
MRN:[id number]                      After Visit Summary   9/5/2017    Aurelio Graf    MRN: [id number]           Thank you!     Thank you for choosing [facility] for your care. Our goal is always to provide you with excellent care. Hearing back from our patients is one way we can continue to improve our services. Please take a few minutes to complete the written survey that you may receive in the mail after you visit with us. Thank you!        Patient Information     Date Of Birth          9/27/1934        About your hospital stay     You were admitted on:  September 5, 2017 You last received care in the:  [facility] [place]    You were discharged on:  September 5, 2017       Who to Call     For medical emergencies, please call 911.  For non-urgent questions about your medical care, please call your primary care provider or clinic, [phone]  For questions related to your surgery, please call your surgery clinic        Attending Provider     Provider Specialty    Kanika Bourgeois MD Ophthalmology       Primary Care Provider Office Phone # Fax #    Anibal Neri [phone]      After Care Instructions     Activity       Avoid strenuous activities the next several days.            Position       Face down at all times.                  Your next 10 appointments already scheduled     Sep 06, 2017  1:00 PM CDT   Post-Op with Sathya Nj MD   [facility]    [facility]  [address] [place] [id number]   [phone]            Sep 14, 2017  1:15 PM CDT   Post-Op with Kanika Bourgeois MD   [facility]    [facility]  [address] [facility]  [facility] [address] [place] [id number]   [phone]              Further instructions from your care team       [facility] Discharge  Instructions  Anesthesia ([facility] Care Center)   Adult Discharge  Instructions    For 24 hours after surgery    1. Get plenty of rest.  Make arrangements to have a responsible adult stay with you for at least 6 hours after you leave the hospital.  2. Do not drive or use heavy equipment for 24 hours.    3. Do not drink alcohol for 24 hours.  4. Do not sign legal documents or make important decisions for 24 hours.  5. Avoid strenuous or risky activities. You may feel lightheaded.  If so, sit for a few minutes before standing.  Have someone help you get up.   6. Conscious sedation patients may resume a regular diet..  7. Any questions of medical nature, call your physician.    [facility]  Discharge Instruction    EyeSurgery [facility]    MD Kanika Moreau MD Joseph Terry, MD  Will I have pain?  Some discomfort is normal and expected following surgery. The first few days after surgery you may need to use prescription pain pills. Taking Tylenol (acetaminophen) regularly may help prevent pain.  Discomfort should gradually decrease and Tylenol should be sufficient to relieve pain. A foreign body sensation in the cornea of the eye is very common and caused by sutures placed at surgery. These sutures will go away in one to two weeks. If the pain worsens, you should call the doctor.  Do I need to wear an eye patch?  You do not need to wear an eye patch at home after the doctor has removed the patch on your first day after surgery. However, you may be more comfortable wearing a patch outside in the sun, when sleeping or napping, or in a armando, windy environment.  How much drainage should I have?  You may expect a moderate amount of drainage for a week. Gradually the drainage should decrease. The lids can be cleaned with a clean washcloth and gentle soap or diluted baby shampoo. Wipe the eyelids gently from the nose outward. Some blood in the tears is a normal finding.  Will there be swelling?  Some swelling is normal for about a week or two  after which it will gradually decrease. Applying a cool compress, using a clean washcloth for 5 - 10 minutes several times a day may reduce the swelling and make you more comfortable. People may have some swelling of both eyes, especially if face down positioning is required. The white part of the eye may appear very red or bloody for a week or two. This may get worse a few days after surgery. Though the bright red appearance can look frightening, it is a normal finding early after surgery and will resolve in a few weeks.  Will I need to use eye drops?  You will be using several different kinds of eye drops or ointment (salve) when you leave the hospital. The directions will be on each bottle or tube. The medication with the red top will keep your eye dilated and may make your eye more sensitive to light. Wearing sunglasses may help. The other medication is a combination antibiotic/steroid to prevent infection and promote healing.  Occasionally a third drop is used to control the pressure in your eye. A new bottle of artificial tears or lubricant ointment may be used along with your prescription eye drops after surgery. You will be using drops from four to eight weeks. Bring all eye medications (drops. ointments, or pills) with you  to each visit.   Always wash your hands before putting in the eye drops. You may wish to have someone else help you. Pull down on the lower lid and squeeze one drop from the bottle being careful not to touch the dropper to your eye or eyelid. One drop is sufficient, but another may be used if the first did not go into the eye. It is often easier to put in the drops if you are reclining or lying down. Wait five (5) minutes after the first drop before using the second drop to allow the medications to absorb into the eye.  How long will it take for my vision to improve?  Your vision should gradually improve, but it may take up to six months to regain your best vision. Frequently, air or gas  bubbles are injected into the eye at the time of surgery. This will blur your vision significantly at first. As the bubble becomes smaller it will cause a black line in your vision that moves as you move your head. As the bubble becomes smaller you may notice that it looks more like a bubble or that it will break up into several smaller bubbles. It will take from a few days to a few weeks for the bubble to dissolve and be replaced by clear fluid.  You may notice floaters or double vision after your surgery. These symptoms usually will decrease with time. If the double vision is bothersome patching the eye may help.  If you notice a sudden worsening in your vision call your doctor.  Are there any physical restrictions after surgery?  If an air or gas bubble was placed in the eye during surgery, you will be asked to spend most of your time (both awake and during the night) with your head in a specific position, frequently face down.Your position is face down and sleep face down. As the eye heals and the bubble dissolves there will be less of a need for you to stay in that specific position. You should avoid sleeping on your back until the bubble has totally dissolved and you have been given permission from your surgeon. You should not fly in an airplane or go to high altitudes in the mountains while there is a bubble in your eye. If you should require any other surgery, under general anesthesia, while you still have an air bubble in your eye, have your surgeon or anesthetist contact us prior to your surgery. Some anesthetic agents can make the bubble expand and seriously damage your eye.  Patients that have a gas/air bubble placed in their eye will have a green medical alert band on their wrist.  This band should not be removed until the doctor removes it for you or gives you permission to remove it.     Heavy lifting (greater than 50 pounds), swimming and contact sports should be avoided for about 3 to 4 weeks after  surgery.  You may resume your usual sexual activities about one week after surgery.  When may I return to work or my normal activities?  Depending on the type or work, you may return to work within a few days. If your work involves physical activity or driving, you will need to restrict your activities and remain home longer.  You may watch television, look at magazines, or work puzzles. Reading may be uncomfortable for several days, but using the eyes will not cause any damage.  You may go outside as usual. If conditions are windy or armando, wear an eye pad to avoid getting dust or dirt in the eye.  Can I travel?  You cannot fly in an airplane or drive into the mountains as long as the air or gas bubble remains in your eye.    Are there any driving restrictions?  Someone will need to drive you home from the hospital. Generally driving can be resumed in several days if you have good vision in your other eye. If you do not feel comfortable driving, do not drive! Your depth perception will be decreased so you will want to try driving during the day in light traffic until you feel comfortable driving. You should restrict your driving while you are taking prescription pain pills as they also can affect your judgment.  When can I shower and wash my hair?  You may shower or bathe when you get home, but avoid getting water in your eye. You may want someone to help you shampoo your hair at first.  You may shave, brush your teeth, or comb your hair. Do not use make-up, mascara, or creams/lotions around your eye for several weeks.  When will I see the doctor again?  Generally, you will be seen the first day after surgery and again 1-2 weeks later. If you have not received a return appointment before leaving the hospital, you should call our office during the business hours to arrange an appointment. If you will be seeing your local doctor instead of us, you will need to call that office to set up an appointment.  How do I  "reach a doctor if I have concerns?  One of our doctors is available by calling [facility] [phone]. Please try to call for routine questions and prescription refills during business hours.  You should call your doctor if:  You notice a sudden decrease in your vision.  Have severe pain or pain increases rather than subsiding.    You notice a new black curtain over your eye that is not the gas bubble. If you have any of these symptoms, you may need to be examined.        2/14/14    Pending Results     No orders found from 9/3/2017 to 9/6/2017.            Admission Information     Date & Time Provider Department Dept. Phone    9/5/2017 Kanika Bourgeois MD [facility] [place] [phone]      Your Vitals Were     Blood Pressure Pulse Temperature Respirations Height Weight    147/68 62 97.2  F (36.2  C) (Temporal) 16 1.778 m (5' 10\") 78.9 kg (174 lb)    Pulse Oximetry BMI (Body Mass Index)                99% 24.97 kg/m2          Care EveryWhere ID     This is your Care EveryWhere ID. This could be used by other organizations to access your [place] medical records  [id number]        Equal Access to Services     SMOOTH BADILLO AH: Hadii mina De La Torre, waaxda marline, qaybta kaalmada layne, chun weiner. So [facility] [phone].    ATENCIÓN: Si habla español, tiene a pena disposición servicios gratuitos de asistencia lingüística. Minal al [phone].    We comply with applicable federal civil rights laws and [place] laws. We do not discriminate on the basis of race, color, national origin, age, disability sex, sexual orientation or gender identity.               Review of your medicines      START taking        Dose / Directions    neomycin-polymixin-dexamethasone ophthalmic suspension   Commonly known as:  MAXITROL   Used for:  Subretinal hemorrhage of right eye        Dose:  1 drop   Apply 1 drop to eye 4 times daily Instill into " operative eye(s) per physician instructions.   Quantity:  5 mL   Refills:  0         CONTINUE these medicines which have NOT CHANGED        Dose / Directions    AMLODIPINE BESYLATE PO        Dose:  5 mg   Take 5 mg by mouth At Bedtime   Refills:  0       ASPIRIN PO        Dose:  81 mg   Take 81 mg by mouth daily   Refills:  0       ATORVASTATIN CALCIUM PO        Dose:  20 mg   Take 20 mg by mouth At Bedtime   Refills:  0       Fish Oil 1200 MG Caps        Refills:  0       hydrochlorothiazide 25 MG tablet   Commonly known as:  HYDRODIURIL        Dose:  25 mg   Take 25 mg by mouth daily   Refills:  0       order for DME   Used for:  Retinal hemorrhage, right, Exudative age-related macular degeneration, right eye, stage unspecified (H)        Equipment being ordered: face down positioning chair for 1 week post op eye surgery   Quantity:  1 Device   Refills:  0       PRESERVISION AREDS PO        Dose:  1 capsule   Take 1 capsule by mouth 2 times daily   Refills:  0            Where to get your medicines      Some of these will need a paper prescription and others can be bought over the counter. Ask your nurse if you have questions.     Bring a paper prescription for each of these medications     neomycin-polymixin-dexamethasone ophthalmic suspension                Protect others around you: Learn how to safely use, store and throw away your medicines at www.disposemymeds.org.             Medication List: This is a list of all your medications and when to take them. Check marks below indicate your daily home schedule. Keep this list as a reference.      Medications           Morning Afternoon Evening Bedtime As Needed    AMLODIPINE BESYLATE PO   Take 5 mg by mouth At Bedtime                                ASPIRIN PO   Take 81 mg by mouth daily                                ATORVASTATIN CALCIUM PO   Take 20 mg by mouth At Bedtime                                Fish Oil 1200 MG Caps                                 hydrochlorothiazide 25 MG tablet   Commonly known as:  HYDRODIURIL   Take 25 mg by mouth daily                                neomycin-polymixin-dexamethasone ophthalmic suspension   Commonly known as:  MAXITROL   Apply 1 drop to eye 4 times daily Instill into operative eye(s) per physician instructions.                                order for DME   Equipment being ordered: face down positioning chair for 1 week post op eye surgery                                PRESERVISION AREDS PO   Take 1 capsule by mouth 2 times daily

## 2017-09-05 NOTE — IP AVS SNAPSHOT
[facility]    [address]    [place] [address]    Phone:  [phone]    Fax:  [phone]                                       After Visit Summary   9/5/2017    Aurelio Graf    MRN: [id number]           After Visit Summary Signature Page     I have received my discharge instructions, and my questions have been answered. I have discussed any challenges I see with this plan with the nurse or doctor.    ..........................................................................................................................................  Patient/Patient Representative Signature      ..........................................................................................................................................  Patient Representative Print Name and Relationship to Patient    ..................................................               ................................................  Date                                            Time    ..........................................................................................................................................  Reviewed by Signature/Title    ...................................................              ..............................................  Date                                                            Time

## 2017-09-06 ENCOUNTER — OFFICE VISIT (OUTPATIENT)
Dept: OPHTHALMOLOGY | Facility: CLINIC | Age: 82
End: 2017-09-06
Attending: OPHTHALMOLOGY
Payer: MEDICARE

## 2017-09-06 DIAGNOSIS — Z98.890 S/P EYE SURGERY: Primary | ICD-10-CM

## 2017-09-06 PROCEDURE — 99212 OFFICE O/P EST SF 10 MIN: CPT | Mod: ZF

## 2017-09-06 ASSESSMENT — VISUAL ACUITY
OD_SC: HM
METHOD: SNELLEN - LINEAR
OS_SC: 20/25
OS_SC+: -1

## 2017-09-06 ASSESSMENT — EXTERNAL EXAM - RIGHT EYE: OD_EXAM: NORMAL

## 2017-09-06 ASSESSMENT — SLIT LAMP EXAM - LIDS
COMMENTS: NORMAL
COMMENTS: NORMAL

## 2017-09-06 ASSESSMENT — EXTERNAL EXAM - LEFT EYE: OS_EXAM: NORMAL

## 2017-09-06 ASSESSMENT — TONOMETRY
OS_IOP_MMHG: 18
IOP_METHOD: ICARE
OD_IOP_MMHG: 12

## 2017-09-06 ASSESSMENT — CUP TO DISC RATIO: OD_RATIO: 0.7

## 2017-09-06 NOTE — PROGRESS NOTES
Postoperative day 1 status post PPV/subretinal and intravitreal TPA / FGX with SF6 20%    Slept well  Retina attached  Doing well    Plan:  Position: face down  No aviation  No heavy lifting   Guidry shield at all times  Retina detachment and endophthalmitis precautions were discussed with the patient and was asked to return if any of the those occur    Medications to operative eye  Maxitrol (pink top) four times a day    Maxitrol oint at bedtime  Atropine (red top) once a day     Follow up in one week       Complete documentation of historical and exam elements from today's encounter can be found in the full encounter summary report (not reduplicated in this progress note).  I personally obtained the chief complaint(s) and history of present illness.  I confirmed and edited as necessary the review of systems, past medical/surgical history, family history, social history, and examination findings as documented by others; and I examined the patient myself.  I personally reviewed the relevant tests, images, and reports as documented above.  I formulated and edited as necessary the assessment and plan and discussed the findings and management plan with the patient and family    Sathya Nj MD PhD  Vitreoretinal Surgery Fellow  [facility]

## 2017-09-06 NOTE — MR AVS SNAPSHOT
After Visit Summary   9/6/2017    Aurelio Graf    MRN: [id number]           Patient Information     Date Of Birth          9/27/1934        Visit Information        Provider Department      9/6/2017 1:00 PM Sathya Nj MD [facility]        Today's Diagnoses     S/P eye surgery    -  1       Follow-ups after your visit        Follow-up notes from your care team     Return in about 1 week (around 9/13/2017).      Your next 10 appointments already scheduled     Sep 14, 2017  1:15 PM CDT   Post-Op with Kanika Bourgeois MD   Eye Clinic ([facility])    [facility]  [address] [facility]  9th Fl Clin 9a  [facility] [address] [phone]              Who to contact     Please call your clinic at [phone] to:    Ask questions about your health    Make or cancel appointments    Discuss your medicines    Learn about your test results    Speak to your doctor   If you have compliments or concerns about an experience at your clinic, or if you wish to file a complaint, please contact [facility] Patient Relations at [phone] or email us at [email or web address]         Additional Information About Your Visit        Care EveryWhere ID     This is your Care EveryWhere ID. This could be used by other organizations to access your [place] medical records  [id number]         Blood Pressure from Last 3 Encounters:   09/05/17 147/68    Weight from Last 3 Encounters:   09/05/17 78.9 kg (174 lb)              Today, you had the following     No orders found for display       Primary Care Provider Office Phone # Fax #    Anibal Neri [phone]       [address] [address]        Equal Access to Services     ISABEL BADILLO : Hadii milla Kumar, chun latham. So [facility] [phone].    ATENCIÓN: Si damion smith pena  disposición servicios gratuitos de asistencia lingüística. Minal myrick [phone].    We comply with applicable federal civil rights laws and [place] laws. We do not discriminate on the basis of race, color, national origin, age, disability sex, sexual orientation or gender identity.            Thank you!     Thank you for choosing EYE CLINIC  for your care. Our goal is always to provide you with excellent care. Hearing back from our patients is one way we can continue to improve our services. Please take a few minutes to complete the written survey that you may receive in the mail after your visit with us. Thank you!             Your Updated Medication List - Protect others around you: Learn how to safely use, store and throw away your medicines at www.disposemymeds.org.          This list is accurate as of: 9/6/17  2:19 PM.  Always use your most recent med list.                   Brand Name Dispense Instructions for use Diagnosis    AMLODIPINE BESYLATE PO      Take 5 mg by mouth At Bedtime        ASPIRIN PO      Take 81 mg by mouth daily        ATORVASTATIN CALCIUM PO      Take 20 mg by mouth At Bedtime        Fish Oil 1200 MG Caps           hydrochlorothiazide 25 MG tablet    HYDRODIURIL     Take 25 mg by mouth daily        neomycin-polymixin-dexamethasone ophthalmic suspension    MAXITROL    5 mL    Apply 1 drop to eye 4 times daily Instill into operative eye(s) per physician instructions.    Subretinal hemorrhage of right eye       order for DME     1 Device    Equipment being ordered: face down positioning chair for 1 week post op eye surgery    Retinal hemorrhage, right, Exudative age-related macular degeneration, right eye, stage unspecified (H)       PRESERVISION AREDS PO      Take 1 capsule by mouth 2 times daily

## 2017-09-06 NOTE — NURSING NOTE
Chief Complaints and History of Present Illnesses   Patient presents with     Post Op (Ophthalmology) Right Eye     1 day s/p vit. RE     HPI    Affected eye(s):  Right   Symptoms:        Duration:  1 day   Frequency:  Constant       Do you have eye pain now?:  No      Comments:  Pt. States that he is doing well.  No c/o comfort BE.  Joyce Foreman COT 1:16 PM September 6, 2017

## 2017-09-14 ENCOUNTER — OFFICE VISIT (OUTPATIENT)
Dept: OPHTHALMOLOGY | Facility: CLINIC | Age: 82
End: 2017-09-14
Attending: OPHTHALMOLOGY
Payer: MEDICARE

## 2017-09-14 DIAGNOSIS — H35.3210 EXUDATIVE AGE-RELATED MACULAR DEGENERATION, RIGHT EYE, STAGE UNSPECIFIED (H): ICD-10-CM

## 2017-09-14 DIAGNOSIS — H35.30 AMD (AGE-RELATED MACULAR DEGENERATION), BILATERAL: Primary | ICD-10-CM

## 2017-09-14 DIAGNOSIS — Z98.890 S/P EYE SURGERY: ICD-10-CM

## 2017-09-14 PROCEDURE — 99212 OFFICE O/P EST SF 10 MIN: CPT | Mod: ZF

## 2017-09-14 ASSESSMENT — VISUAL ACUITY
CORRECTION_TYPE: GLASSES
OD_CC: 5/200
OS_CC+: -1
OS_CC: 20/25
METHOD: SNELLEN - LINEAR

## 2017-09-14 ASSESSMENT — CONF VISUAL FIELD
OD_SUPERIOR_NASAL_RESTRICTION: 3
OD_SUPERIOR_TEMPORAL_RESTRICTION: 3
OD_INFERIOR_TEMPORAL_RESTRICTION: 1
OD_INFERIOR_NASAL_RESTRICTION: 3
OS_NORMAL: 1
METHOD: COUNTING FINGERS

## 2017-09-14 ASSESSMENT — EXTERNAL EXAM - LEFT EYE: OS_EXAM: NORMAL

## 2017-09-14 ASSESSMENT — SLIT LAMP EXAM - LIDS
COMMENTS: NORMAL
COMMENTS: NORMAL

## 2017-09-14 ASSESSMENT — CUP TO DISC RATIO: OD_RATIO: 0.7

## 2017-09-14 ASSESSMENT — TONOMETRY
OD_IOP_MMHG: 19
OS_IOP_MMHG: 22
IOP_METHOD: TONOPEN

## 2017-09-14 ASSESSMENT — EXTERNAL EXAM - RIGHT EYE: OD_EXAM: NORMAL

## 2017-09-14 NOTE — PROGRESS NOTES
Postoperative week 1 status post PPV/subretinal and intravitreal TPA / FGX with SF6 20%    Slept well  Retina attached  Retinal hemorrhage appears to be resolving  Doing well    Plan:  Position: face down, R or L side down   No aviation  No heavy lifting   Guidry shield at all times  Retina detachment and endophthalmitis precautions were discussed with the patient and was asked to return if any of the those occur    Medications to operative eye  Maxitrol (pink top)tid and slow tapering   Stop Maxitrol oint    Stop Atropine    Follow up in 3 weeks with OCT and Avastin injection  Will probably need photodynamic therapy (PDT)     Sathya Nj MD, PhD  Vitreoretinal Surgery Fellow    ~~~~~~~~~~~~~~~~~~~~~~~~~~~~~~~~~~      Kanika Bourgeois MD  .  Retina Service   Department of Ophthalmology and Visual Neurosciences   [facility]  Phone: [phone]   Fax: [phone]

## 2017-09-14 NOTE — NURSING NOTE
Chief Complaints and History of Present Illnesses   Patient presents with     Follow Up For     1 week follow up  s/p  PPV/subretinal and intravitreal TPA / FGX with SF6 20% Right Eye     HPI    Affected eye(s):  Right   Symptoms:     No floaters   No flashes         Do you have eye pain now?:  No      Comments:  Pt states vision is slightly better since last visit, but still blurry. Occasional black spot seeing in the upper right part of pts vision. Last seen 3-4 days ago.    Scott AGUIRRE September 14, 2017 1:23 PM

## 2017-09-14 NOTE — MR AVS SNAPSHOT
After Visit Summary   9/14/2017    Aurelio Graf    MRN: [id number]           Patient Information     Date Of Birth          9/27/1934        Visit Information        Provider Department      9/14/2017 1:15 PM Kanika Bourgeois MD [facility]        Today's Diagnoses     AMD (age-related macular degeneration), bilateral    -  1    S/P eye surgery           Follow-ups after your visit        Follow-up notes from your care team     Return in about 3 weeks (around 10/5/2017) for OCT Mac, OCT.      Your next 10 appointments already scheduled     Oct 05, 2017  1:15 PM CDT   RETURN RETINA with Kanika Bourgeois MD   Eye Clinic ([facility])    [address]  [address]  9th [address] Clin 9a  [facility] [id number] [phone]              Future tests that were ordered for you today     Open Future Orders        Priority Expected Expires Ordered    OCT Retina Spectralis OU (both eyes) Routine  3/18/2019 9/14/2017            Who to contact     Please call your clinic at [phone] to:    Ask questions about your health    Make or cancel appointments    Discuss your medicines    Learn about your test results    Speak to your doctor   If you have compliments or concerns about an experience at your clinic, or if you wish to file a complaint, please contact [facility] Patient Relations at [phone] or email us at [email or web address]         Additional Information About Your Visit        Care EveryWhere ID     This is your Care EveryWhere ID. This could be used by other organizations to access your [facility] medical records  [id number]         Blood Pressure from Last 3 Encounters:   09/05/17 147/68    Weight from Last 3 Encounters:   09/05/17 78.9 kg (174 lb)               Primary Care Provider Office Phone # Fax #    Anibal Neri [phone]       [id number] [place] [address]  Access to Services     [facility]: Hadii aad ku hadjohny Harperali, wagaboda luqdelmy, qablu kahernandochun delgado. So [facility] [phone].    ATENCIÓN: Si maritala nat, tiene a pena disposición servicios gratuitos de asistencia lingüística. Llame al [phone].    We comply with applicable federal civil rights laws and [place] laws. We do not discriminate on the basis of race, color, national origin, age, disability sex, sexual orientation or gender identity.            Thank you!     Thank you for choosing EYE CLINIC  for your care. Our goal is always to provide you with excellent care. Hearing back from our patients is one way we can continue to improve our services. Please take a few minutes to complete the written survey that you may receive in the mail after your visit with us. Thank you!             Your Updated Medication List - Protect others around you: Learn how to safely use, store and throw away your medicines at www.disposemymeds.org.          This list is accurate as of: 9/14/17  2:31 PM.  Always use your most recent med list.                   Brand Name Dispense Instructions for use Diagnosis    AMLODIPINE BESYLATE PO      Take 5 mg by mouth At Bedtime        ASPIRIN PO      Take 81 mg by mouth daily        ATORVASTATIN CALCIUM PO      Take 20 mg by mouth At Bedtime        Fish Oil 1200 MG Caps           hydrochlorothiazide 25 MG tablet    HYDRODIURIL     Take 25 mg by mouth daily        neomycin-polymixin-dexamethasone ophthalmic suspension    MAXITROL    5 mL    Apply 1 drop to eye 4 times daily Instill into operative eye(s) per physician instructions.    Subretinal hemorrhage of right eye       order for DME     1 Device    Equipment being ordered: face down positioning chair for 1 week post op eye surgery    Retinal hemorrhage, right, Exudative age-related macular degeneration, right eye, stage unspecified (H)       PRESERVISION AREDS PO      Take 1  capsule by mouth 2 times daily

## 2017-10-05 ENCOUNTER — OFFICE VISIT (OUTPATIENT)
Dept: OPHTHALMOLOGY | Facility: CLINIC | Age: 82
End: 2017-10-05
Attending: OPHTHALMOLOGY
Payer: MEDICARE

## 2017-10-05 DIAGNOSIS — Z48.810 AFTERCARE FOLLOWING SURGERY OF A SENSORY ORGAN: ICD-10-CM

## 2017-10-05 DIAGNOSIS — H35.30 AMD (AGE-RELATED MACULAR DEGENERATION), BILATERAL: ICD-10-CM

## 2017-10-05 DIAGNOSIS — H35.3230 EXUDATIVE AGE-RELATED MACULAR DEGENERATION, BILATERAL, STAGE UNSPECIFIED (H): Primary | ICD-10-CM

## 2017-10-05 PROCEDURE — 67028 INJECTION EYE DRUG: CPT | Mod: ZF | Performed by: OPHTHALMOLOGY

## 2017-10-05 PROCEDURE — 92134 CPTRZ OPH DX IMG PST SGM RTA: CPT | Mod: ZF | Performed by: OPHTHALMOLOGY

## 2017-10-05 PROCEDURE — C9257 BEVACIZUMAB INJECTION: HCPCS | Mod: ZF | Performed by: OPHTHALMOLOGY

## 2017-10-05 PROCEDURE — 25000128 H RX IP 250 OP 636: Mod: ZF | Performed by: OPHTHALMOLOGY

## 2017-10-05 PROCEDURE — 40000269 ZZH STATISTIC NO CHARGE FACILITY FEE: Mod: ZF

## 2017-10-05 RX ADMIN — Medication 1.25 MG: at 15:34

## 2017-10-05 ASSESSMENT — TONOMETRY
IOP_METHOD: TONOPEN
OS_IOP_MMHG: 21
OD_IOP_MMHG: 15

## 2017-10-05 ASSESSMENT — EXTERNAL EXAM - RIGHT EYE: OD_EXAM: NORMAL

## 2017-10-05 ASSESSMENT — EXTERNAL EXAM - LEFT EYE: OS_EXAM: NORMAL

## 2017-10-05 ASSESSMENT — REFRACTION_WEARINGRX
OD_ADD: +2.50
OS_ADD: +2.50
OD_SPHERE: -0.25
OD_AXIS: 180
OS_SPHERE: PLANO
OD_CYLINDER: +0.50

## 2017-10-05 ASSESSMENT — VISUAL ACUITY
METHOD: SNELLEN - LINEAR
OS_CC: 20/40
OD_CC: 5/200
OS_PH_CC: 20/30+3
CORRECTION_TYPE: GLASSES

## 2017-10-05 ASSESSMENT — SLIT LAMP EXAM - LIDS
COMMENTS: NORMAL
COMMENTS: NORMAL

## 2017-10-05 ASSESSMENT — CONF VISUAL FIELD
OD_NORMAL: 1
OS_NORMAL: 1
METHOD: COUNTING FINGERS

## 2017-10-05 ASSESSMENT — CUP TO DISC RATIO: OD_RATIO: 0.7

## 2017-10-05 NOTE — PROGRESS NOTES
CC: follow up  status post PPV/subretinal and intravitreal TPA / FGX with SF6 20%    Retina attached  Retinal hemorrhage appears to be resolving and VA improving slightly  Optical Coherence Tomography 10.05.17  Right eye: decreased subretinal fluid and hypereflectivity; stable to slightly increased sub-Retinal pigment epithelium heme  Left eye: drusen and Retinal pigment epithelium changes; no subretinal fluid   Plan:  Continue tapering eyedrops  Retina detachment and endophthalmitis precautions were discussed with the patient and was asked to return if any of the those occur    Follow up in 1 for fluorescein angiography transits right eye and photodynamic therapy (PDT)   ~~~~~~~~~~~~~~~~~~~~~~~~~~~~~~~~~~   Complete documentation of historical and exam elements from today's encounter can be found in the full encounter summary report (not reduplicated in this progress note).  I personally obtained the chief complaint(s) and history of present illness.  I confirmed and edited as necessary the review of systems, past medical/surgical history, family history, social history, and examination findings as documented by others; and I examined the patient myself.  I personally reviewed the relevant tests, images, and reports as documented above.  I formulated and edited as necessary the assessment and plan and discussed the findings and management plan with the patient and family    Kanika Bourgeois MD  .  Retina Service   Department of Ophthalmology and Visual Neurosciences   [facility]  Phone: [phone]   Fax: [phone]

## 2017-10-05 NOTE — NURSING NOTE
Chief Complaints and History of Present Illnesses   Patient presents with     Follow Up For     4 week follow up for Retinal det.  PPV/subretinal and intravitreal TPA with OCT and Avastin injection     HPI    Affected eye(s):  Right   Symptoms:     No blurred vision   Floaters   No flashes   No Dryness   Burning (Comment: Some burning in the morning and goes away throughout the day )      Frequency:  Intermittent       Do you have eye pain now?:  No      Comments:  Patient reports vision stayed stable. No flashes/floaters. Pt reports some pain in the morning but it gets better. Some tearing when outside.     Frida Mackey October 5, 2017 OTS 1:57 PM  Joyce Foreman COT 2:20 PM October 5, 2017

## 2017-10-05 NOTE — MR AVS SNAPSHOT
After Visit Summary   10/5/2017    Aurelio Graf    MRN: [id number]           Patient Information     Date Of Birth          9/27/1934        Visit Information        Provider Department      10/5/2017 1:15 PM Kanika Bourgeois MD [facility]        Today's Diagnoses     Exudative age-related macular degeneration, bilateral, stage unspecified (H)    -  1    AMD (age-related macular degeneration), bilateral           Follow-ups after your visit        Your next 10 appointments already scheduled     Oct 12, 2017 10:00 AM CDT   RETURN RETINA with Kanika Bourgeois MD   Eye Clinic ([facility])    [address]  [address]  [address] Clin [address]a  [facility] [address] [phone]              Who to contact     Please call your clinic at [phone] to:    Ask questions about your health    Make or cancel appointments    Discuss your medicines    Learn about your test results    Speak to your doctor   If you have compliments or concerns about an experience at your clinic, or if you wish to file a complaint, please contact [facility] Patient Relations at [phone] or email us at [email or web address]         Additional Information About Your Visit        Care EveryWhere ID     This is your Care EveryWhere ID. This could be used by other organizations to access your [facility] medical records  [id number]         Blood Pressure from Last 3 Encounters:   09/05/17 147/68    Weight from Last 3 Encounters:   09/05/17 78.9 kg (174 lb)              We Performed the Following     Avastin (Bevacizumab) 1.25MG Intravitreal Injection OD (right eye)     OCT Retina Spectralis OU (both eyes)        Primary Care Provider Office Phone # Fax #    Anibal Neri [phone]       [address] [address]        Equal Access to Services     SMOOTH BADILLO AH: milla Hawkins qaybta  chun landryamando meneses ah. [facility] [phone].    ATENCIÓN: Si emi johns, tiene a pena disposición servicios gratuitos de asistencia lingüística. Minal al [phone].    We comply with applicable federal civil rights laws and [place] laws. We do not discriminate on the basis of race, color, national origin, age, disability, sex, sexual orientation, or gender identity.            Thank you!     Thank you for choosing EYE CLINIC  for your care. Our goal is always to provide you with excellent care. Hearing back from our patients is one way we can continue to improve our services. Please take a few minutes to complete the written survey that you may receive in the mail after your visit with us. Thank you!             Your Updated Medication List - Protect others around you: Learn how to safely use, store and throw away your medicines at www.disposemymeds.org.          This list is accurate as of: 10/5/17  3:51 PM.  Always use your most recent med list.                   Brand Name Dispense Instructions for use Diagnosis    Aflibercept 2 MG/0.05ML Soln injection    EYLEA    0.05 mL    0.05 mLs (2 mg) by Intravitreal route every 28 days for 12 doses    Exudative age-related macular degeneration, right eye, stage unspecified (H)       AMLODIPINE BESYLATE PO      Take 5 mg by mouth At Bedtime        ASPIRIN PO      Take 81 mg by mouth daily        ATORVASTATIN CALCIUM PO      Take 20 mg by mouth At Bedtime        bevacizumab 25 mg/mL intravitreal inj    AVASTIN    0.05 mL    0.05 mLs (1.25 mg) by Intravitreal route every 28 days for 12 doses    Exudative age-related macular degeneration, right eye, stage unspecified (H)       fish Oil 1200 MG capsule           hydrochlorothiazide 25 MG tablet    HYDRODIURIL     Take 25 mg by mouth daily        neomycin-polymixin-dexamethasone ophthalmic suspension    MAXITROL    5 mL    Apply 1 drop to eye 4 times daily Instill into operative  eye(s) per physician instructions.    Subretinal hemorrhage of right eye       order for DME     1 Device    Equipment being ordered: face down positioning chair for 1 week post op eye surgery    Retinal hemorrhage, right, Exudative age-related macular degeneration, right eye, stage unspecified (H)       PRESERVISION AREDS PO      Take 1 capsule by mouth 2 times daily        ranibizumab 0.5 MG/0.05ML Soln    LUCENTIS    0.05 mL    0.05 mLs (0.5 mg) by Intravitreal route every 28 days for 12 doses    Exudative age-related macular degeneration, right eye, stage unspecified (H)

## 2017-10-12 ENCOUNTER — OFFICE VISIT (OUTPATIENT)
Dept: OPHTHALMOLOGY | Facility: CLINIC | Age: 82
End: 2017-10-12
Attending: OPHTHALMOLOGY
Payer: MEDICARE

## 2017-10-12 DIAGNOSIS — H35.3230 EXUDATIVE AGE-RELATED MACULAR DEGENERATION, BILATERAL, STAGE UNSPECIFIED (H): Primary | ICD-10-CM

## 2017-10-12 DIAGNOSIS — H35.61 SUBRETINAL HEMORRHAGE OF RIGHT EYE: ICD-10-CM

## 2017-10-12 PROCEDURE — 67221 OCULAR PHOTODYNAMIC THER: CPT | Mod: ZF | Performed by: OPHTHALMOLOGY

## 2017-10-12 PROCEDURE — 25000128 H RX IP 250 OP 636: Mod: ZF | Performed by: OPHTHALMOLOGY

## 2017-10-12 PROCEDURE — 99213 OFFICE O/P EST LOW 20 MIN: CPT | Mod: 25,ZF

## 2017-10-12 PROCEDURE — 92235 FLUORESCEIN ANGRPH MLTIFRAME: CPT | Mod: ZF | Performed by: OPHTHALMOLOGY

## 2017-10-12 RX ADMIN — VERTEPORFIN FOR INJECTION 12 MG: 15 INJECTION, POWDER, LYOPHILIZED, FOR SOLUTION INTRAVENOUS at 17:01

## 2017-10-12 ASSESSMENT — VISUAL ACUITY
CORRECTION_TYPE: GLASSES
OS_CC: 20/30
OD_CC: 2/200 E
METHOD: SNELLEN - LINEAR

## 2017-10-12 ASSESSMENT — TONOMETRY
OD_IOP_MMHG: 12
IOP_METHOD: TONOPEN
OS_IOP_MMHG: 18

## 2017-10-12 ASSESSMENT — EXTERNAL EXAM - LEFT EYE: OS_EXAM: NORMAL

## 2017-10-12 ASSESSMENT — CONF VISUAL FIELD
OS_NORMAL: 1
METHOD: COUNTING FINGERS
OD_SUPERIOR_TEMPORAL_RESTRICTION: 3
OD_SUPERIOR_NASAL_RESTRICTION: 1
OD_INFERIOR_TEMPORAL_RESTRICTION: 3
OD_INFERIOR_NASAL_RESTRICTION: 3

## 2017-10-12 ASSESSMENT — REFRACTION_WEARINGRX
OD_SPHERE: -0.25
OS_ADD: +2.50
OD_CYLINDER: +0.50
OS_SPHERE: PLANO
OD_AXIS: 180
OD_ADD: +2.50

## 2017-10-12 ASSESSMENT — EXTERNAL EXAM - RIGHT EYE: OD_EXAM: NORMAL

## 2017-10-12 ASSESSMENT — SLIT LAMP EXAM - LIDS
COMMENTS: NORMAL
COMMENTS: NORMAL

## 2017-10-12 ASSESSMENT — CUP TO DISC RATIO
OD_RATIO: 0.7
OS_RATIO: 0.7

## 2017-10-12 NOTE — NURSING NOTE
Chief Complaints and History of Present Illnesses   Patient presents with     Follow Up For     1 week follow up status post PPV/subretinal and intravitreal TPA / FGX with SF6 20%     HPI    Affected eye(s):  Right   Symptoms:     No floaters   No flashes   No redness   No Dryness         Do you have eye pain now?:  No      Comments:  Pt states vision is slightly better today. No eye pain today.    Scott AGUIRRE October 12, 2017 9:57 AM

## 2017-10-12 NOTE — MR AVS SNAPSHOT
After Visit Summary   10/12/2017    Aurelio Graf    MRN: [id number]           Patient Information     Date Of Birth          9/27/1934        Visit Information        Provider Department      10/12/2017 10:00 AM Kanika Bourgeois MD Eye Clinic        Today's Diagnoses     Exudative age-related macular degeneration, bilateral, stage unspecified (H)    -  1    Subretinal hemorrhage of right eye           Follow-ups after your visit        Follow-up notes from your care team     Return in about 3 months (around 1/12/2018) for OCT.      Your next 10 appointments already scheduled     Nov 02, 2017  2:00 PM CDT   RETURN RETINA with Kanika Bourgeois MD   Eye Clinic ([facility])    [address]  [address]  9th [address] Clin 9a  [facility] [id number] [phone]              Future tests that were ordered for you today     Open Future Orders        Priority Expected Expires Ordered    OCT Retina Spectralis OU (both eyes) Routine  4/15/2019 10/12/2017            Who to contact     Please call your clinic at [phone] to:    Ask questions about your health    Make or cancel appointments    Discuss your medicines    Learn about your test results    Speak to your doctor   If you have compliments or concerns about an experience at your clinic, or if you wish to file a complaint, please contact [facility] Patient Relations at [phone] or email us at [email or web address]         Additional Information About Your Visit        Care EveryWhere ID     This is your Care EveryWhere ID. This could be used by other organizations to access your [facility] medical records  [id number]         Blood Pressure from Last 3 Encounters:   09/05/17 147/68    Weight from Last 3 Encounters:   09/05/17 78.9 kg (174 lb)              We Performed the Following     Fluorescein Angiography OU (both eyes)        Primary Care Provider Office Phone # Fax #     Anibal Neri [phone]       [address] [address]        Equal Access to Services     SMOOTH BADILL[employer] : Mayo mina chand ginger De La Torre, wagaboda sukhjinderdelmy, qajose miguelta kahernandoda layne, chun mablerufino crespo lakristenrylan regine. So [facility] [phone].    ATENCIÓN: Si habla español, tiene a pena disposición servicios gratuitos de asistencia lingüística. Llame al [phone].    We comply with applicable federal civil rights laws and [place] laws. We do not discriminate on the basis of race, color, national origin, age, disability, sex, sexual orientation, or gender identity.            Thank you!     Thank you for choosing EYE CLINIC  for your care. Our goal is always to provide you with excellent care. Hearing back from our patients is one way we can continue to improve our services. Please take a few minutes to complete the written survey that you may receive in the mail after your visit with us. Thank you!             Your Updated Medication List - Protect others around you: Learn how to safely use, store and throw away your medicines at www.disposemymeds.org.          This list is accurate as of: 10/12/17  1:09 PM.  Always use your most recent med list.                   Brand Name Dispense Instructions for use Diagnosis    Aflibercept 2 MG/0.05ML Soln injection    EYLEA    0.05 mL    0.05 mLs (2 mg) by Intravitreal route every 28 days for 12 doses    Exudative age-related macular degeneration, right eye, stage unspecified (H)       AMLODIPINE BESYLATE PO      Take 5 mg by mouth At Bedtime        ASPIRIN PO      Take 81 mg by mouth daily        ATORVASTATIN CALCIUM PO      Take 20 mg by mouth At Bedtime        bevacizumab 25 mg/mL intravitreal inj    AVASTIN    0.05 mL    0.05 mLs (1.25 mg) by Intravitreal route every 28 days for 12 doses    Exudative age-related macular degeneration, right eye, stage unspecified (H)       fish Oil 1200 MG capsule            hydrochlorothiazide 25 MG tablet    HYDRODIURIL     Take 25 mg by mouth daily        neomycin-polymixin-dexamethasone ophthalmic suspension    MAXITROL    5 mL    Apply 1 drop to eye 4 times daily Instill into operative eye(s) per physician instructions.    Subretinal hemorrhage of right eye       order for DME     1 Device    Equipment being ordered: face down positioning chair for 1 week post op eye surgery    Retinal hemorrhage, right, Exudative age-related macular degeneration, right eye, stage unspecified (H)       PRESERVISION AREDS PO      Take 1 capsule by mouth 2 times daily        ranibizumab 0.5 MG/0.05ML Soln    LUCENTIS    0.05 mL    0.05 mLs (0.5 mg) by Intravitreal route every 28 days for 12 doses    Exudative age-related macular degeneration, right eye, stage unspecified (H)

## 2017-10-12 NOTE — PROGRESS NOTES
CC: follow up  status post PPV/subretinal and intravitreal TPA / FGX with SF6 20% possible photodynamic therapy (PDT) today       Retina attached  Retinal hemorrhage appears to be resolving and VA improving slightly    OCULAR IMAGING    Optical Coherence Tomography 10.05.17  Right eye: decreased subretinal fluid and hypereflectivity; stable to slightly increased sub-Retinal pigment epithelium heme  Left eye: drusen and Retinal pigment epithelium changes; no subretinal fluid     fluorescein angiography 10-12-17  Right eye  Left eye      CC -   Retinal hemorrhage    INTERVAL HISTORY - Initial visit    HPI -   Aurelio Graf is a  82 year old year-old patient presenting from Dr. Maverick Vann for foveal hemorrhage. He has a history of ARMD on AREDS, cataracts. He was at his cabin near [place] and last Tuesday when he noticed decreased vision while watching TV. On Wednesday, he covered his left eye and noticed very blurry vision in his right eye. He was seen in the ED in [facility] where a stroke workup was negative. He was seen by Belkis Garcia and Edwar in [place].     PAST OCULAR SURGERY  None    RETINAL IMAGING:  OCT 8/31/17  OD - large subretinal fluid, heme and sub rpe fluid, heme, drusen  OS - many scattered drusen,no srf/irf    FA 8/31/17  OD - large area of blocking involving fovea and extending beyond inferior arcade, pinpoint area of leakage inferiorly  OS - normal AV and choroidal filling. early hypofluorescence of drusen with late staining    ICG 8/31/17  OD - blockage large area of blocking involving fovea and extending beyond inferior arcade, pinpoint area of leakage inferiorly  OS -. 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   -status post Avastin  injections   -s/p  25G PPV/AFx/ gas left eye and TPA for subretinal hemorrhage displacement 9/5/17   - recommend photodynamic therapy (PDT) today (spot size 5 mm- half fluence photodynamic therapy (PDT))     2) ARMD, both eyes   -currently on AREDS   -healthy diet, no smoking   -Amsler grid    3) Cataracts bilateral   -observe for now   -likely visually significant    4) pseudoexfoliation, left eye   -IOP wnl   -given appearance of C/D ratio, may need baseline testing for glaucoma    return to clinic: 3 weeks Optical Coherence Tomography macular and avastin injection right eye     Angel Martins MD  PGY    Plan:  Continue tapering eyedrops  Retina detachment and endophthalmitis precautions were discussed with the patient and was asked to return if any of the those occur    Follow up in 3 weeks with Optical Coherence Tomography both eyes and avastin injection right eye   ~~~~~~~~~~~~~~~~~~~~~~~~~~~~~~~~~~   Complete documentation of historical and exam elements from today's encounter can be found in the full encounter summary report (not reduplicated in this progress note).  I personally obtained the chief complaint(s) and history of present illness.  I confirmed and edited as necessary the review of systems, past medical/surgical history, family history, social history, and examination findings as documented by others; and I examined the patient myself.  I personally reviewed the relevant tests, images, and reports as documented above.  I formulated and edited as necessary the assessment and plan and discussed the findings and management plan with the patient and family and No resident or fellow assisted with the procedures performed.  I performed the procedures myself.    Kanika Bourgeois,  MD  .  Retina Service   Department of Ophthalmology and Visual Neurosciences   [facility]  Phone: [phone]   Fax: [phone]

## 2017-11-02 ENCOUNTER — OFFICE VISIT (OUTPATIENT)
Dept: OPHTHALMOLOGY | Facility: CLINIC | Age: 82
End: 2017-11-02
Attending: OPHTHALMOLOGY
Payer: MEDICARE

## 2017-11-02 DIAGNOSIS — H35.3230 EXUDATIVE AGE-RELATED MACULAR DEGENERATION, BILATERAL, STAGE UNSPECIFIED (H): Primary | ICD-10-CM

## 2017-11-02 DIAGNOSIS — H31.411 HEMORRHAGIC CHOROIDAL DETACHMENT OF RIGHT EYE: ICD-10-CM

## 2017-11-02 PROCEDURE — 99213 OFFICE O/P EST LOW 20 MIN: CPT | Mod: ZF

## 2017-11-02 PROCEDURE — 92134 CPTRZ OPH DX IMG PST SGM RTA: CPT | Mod: ZF | Performed by: OPHTHALMOLOGY

## 2017-11-02 PROCEDURE — 76512 OPH US DX B-SCAN: CPT | Mod: ZF | Performed by: OPHTHALMOLOGY

## 2017-11-02 ASSESSMENT — CUP TO DISC RATIO
OD_RATIO: 0.7
OS_RATIO: 0.7

## 2017-11-02 ASSESSMENT — CONF VISUAL FIELD
OD_INFERIOR_NASAL_RESTRICTION: 1
OD_SUPERIOR_NASAL_RESTRICTION: 1
OD_SUPERIOR_TEMPORAL_RESTRICTION: 1
OD_INFERIOR_TEMPORAL_RESTRICTION: 1
OS_NORMAL: 1

## 2017-11-02 ASSESSMENT — REFRACTION_WEARINGRX
OD_SPHERE: -0.25
OS_SPHERE: PLANO
OD_ADD: +2.50
OD_CYLINDER: +0.50
OD_AXIS: 180
OS_ADD: +2.50

## 2017-11-02 ASSESSMENT — VISUAL ACUITY
OS_CC+: -2
METHOD: SNELLEN - LINEAR
OS_CC: 20/25
OD_CC: LP

## 2017-11-02 ASSESSMENT — EXTERNAL EXAM - RIGHT EYE: OD_EXAM: NORMAL

## 2017-11-02 ASSESSMENT — TONOMETRY
OD_IOP_MMHG: 18
IOP_METHOD: TONOPEN
OS_IOP_MMHG: 22

## 2017-11-02 ASSESSMENT — SLIT LAMP EXAM - LIDS
COMMENTS: NORMAL
COMMENTS: NORMAL

## 2017-11-02 ASSESSMENT — EXTERNAL EXAM - LEFT EYE: OS_EXAM: NORMAL

## 2017-11-02 NOTE — MR AVS SNAPSHOT
After Visit Summary   11/2/2017    Aurelio Graf    MRN: [id number]           Patient Information     Date Of Birth          9/27/1934        Visit Information        Provider Department      11/2/2017 2:00 PM Kanika Bourgeois MD [facility] Clinic        Today's Diagnoses     Exudative age-related macular degeneration, bilateral, stage unspecified (H)    -  1       Follow-ups after your visit        Your next 10 appointments already scheduled     Nov 21, 2017  1:00 PM CST   NEW RETINA with Ruth Servin MD   Eye Clinic ([facility])    [address]  [address]  [address]  [facility] [address]   [phone]              Who to contact     Please call your clinic at [phone] to:    Ask questions about your health    Make or cancel appointments    Discuss your medicines    Learn about your test results    Speak to your doctor   If you have compliments or concerns about an experience at your clinic, or if you wish to file a complaint, please contact [facility] Patient Relations at [phone] or email us at [email or web address]         Additional Information About Your Visit        Care EveryWhere ID     This is your Care EveryWhere ID. This could be used by other organizations to access your [place] medical records  [id number]         Blood Pressure from Last 3 Encounters:   09/05/17 147/68    Weight from Last 3 Encounters:   09/05/17 78.9 kg (174 lb)              We Performed the Following     OCT Retina Spectralis OU (both eyes)     Ultrasound B-scan OD (right eye)        Primary Care Provider Office Phone # Fax #    Anibal Neri [phone]       [address] [address]        Equal Access to Services     SMOOTH BADILLO : milla Hawkins qaybta kaalmada adeegyada, waxay idiin hayaan adeeg kharash la'aan . [facility]  [phone].    ATENCIÓN: Si emi johns, tiene a pena disposición servicios gratuitos de asistencia lingüística. Minal myrick [phone].    We comply with applicable federal civil rights laws and [place] laws. We do not discriminate on the basis of race, color, national origin, age, disability, sex, sexual orientation, or gender identity.            Thank you!     Thank you for choosing EYE CLINIC  for your care. Our goal is always to provide you with excellent care. Hearing back from our patients is one way we can continue to improve our services. Please take a few minutes to complete the written survey that you may receive in the mail after your visit with us. Thank you!             Your Updated Medication List - Protect others around you: Learn how to safely use, store and throw away your medicines at www.disposemymeds.org.          This list is accurate as of: 11/2/17  5:38 PM.  Always use your most recent med list.                   Brand Name Dispense Instructions for use Diagnosis    Aflibercept 2 MG/0.05ML Soln injection    EYLEA    0.05 mL    0.05 mLs (2 mg) by Intravitreal route every 28 days for 12 doses    Exudative age-related macular degeneration, right eye, stage unspecified (H)       AMLODIPINE BESYLATE PO      Take 5 mg by mouth At Bedtime        ASPIRIN PO      Take 81 mg by mouth daily        ATORVASTATIN CALCIUM PO      Take 20 mg by mouth At Bedtime        bevacizumab 25 mg/mL intravitreal inj    AVASTIN    0.05 mL    0.05 mLs (1.25 mg) by Intravitreal route every 28 days for 12 doses    Exudative age-related macular degeneration, right eye, stage unspecified (H)       fish Oil 1200 MG capsule           hydrochlorothiazide 25 MG tablet    HYDRODIURIL     Take 25 mg by mouth daily        neomycin-polymixin-dexamethasone ophthalmic suspension    MAXITROL    5 mL    Apply 1 drop to eye 4 times daily Instill into operative eye(s) per physician instructions.    Subretinal hemorrhage of right eye        order for DME     1 Device    Equipment being ordered: face down positioning chair for 1 week post op eye surgery    Retinal hemorrhage, right, Exudative age-related macular degeneration, right eye, stage unspecified (H)       PRESERVISION AREDS PO      Take 1 capsule by mouth 2 times daily        ranibizumab 0.5 MG/0.05ML Soln    LUCENTIS    0.05 mL    0.05 mLs (0.5 mg) by Intravitreal route every 28 days for 12 doses    Exudative age-related macular degeneration, right eye, stage unspecified (H)

## 2017-11-02 NOTE — NURSING NOTE
Chief Complaints and History of Present Illnesses   Patient presents with     Follow Up For     Retinal hemorrhage     HPI    Affected eye(s):  Both   Symptoms:        Frequency:  Constant       Do you have eye pain now?:  No      Comments:  States that after the last laser the va has decreased  jesus Madison COT 2:45 PM November 2, 2017

## 2017-11-02 NOTE — PROGRESS NOTES
CC: follow up  status post PPV/subretinal and intravitreal TPA / FGX with SF6 20%  9/5/17  Status post photodynamic therapy (PDT) 10.12.17  For submacular heme    HPI -  Aurelio Graf is a  82 year old year-old patient presenting from Dr. Maverick Vann for large sub foveal hemorrhage. He has a history of ARMD on AREDS, cataracts. He was at his cabin near [place] and last Tuesday when he noticed decreased vision while watching TV. On Wednesday, he covered his left eye and noticed very blurry vision in his right eye. He was seen in the ED in [facility] where a stroke workup was negative. He was seen by Belkis Garcia and Edwar in [place].   status post PPV/subretinal and intravitreal TPA / FGX with SF6 20%  9/5/17. On post-op examination, subRetinal hemorrhage appeared to be resolving and VA improving slightly.   Interim: patient reports decreased VA right eye the day after photodynamic therapy (PDT). No eye pain     OCULAR IMAGING  Optical Coherence Tomography 11.2.17  Right eye: unable to obtain  Left eye: drusen and Retinal pigment epithelium changes; no subretinal fluid     Ultrasound: Significant elevated choroidal lesions in posterior pole and into periphery. Posterior lesion is consistent with history of neovascular AMD noted on OCT October 2017, has significantly increased in size.   Possible hemorrhagic choriodals with some serous component. No obvious Retinal detachment      ASSESSMENT & PLAN  1) Patient with 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  Patient also on aspirin     Plan:  - will observe closely with ultrasound  - recommend patient if possible to stop aspirin  - might require Pars plana vitrectomy (PPV) with possible Cataract extraction, choroidal drainage and possible Silicone Oil right eye   - will observe closely for now as there is  No Retinal detachment       2) ARMD, both eyes   -currently on AREDS   -healthy diet, no smoking   -Amsler grid    3) Cataracts bilateral   -observe for now   -likely visually significant    4) pseudoexfoliation, left eye   -IOP wnl   -given appearance of C/D ratio, may need baseline testing for glaucoma    Plan:  Continue tapering eyedrops  Follow up in 2-3 weeks with ultrasound   ~~~~~~~~~~~~~~~~~~~~~~~~~~~~~~~~~~   Complete documentation of historical and exam elements from today's encounter can be found in the full encounter summary report (not reduplicated in this progress note).  I personally obtained the chief complaint(s) and history of present illness.  I confirmed and edited as necessary the review of systems, past medical/surgical history, family history, social history, and examination findings as documented by others; and I examined the patient myself.  I personally reviewed the relevant tests, images, and reports as documented above.  I formulated and edited as necessary the assessment and plan and discussed the findings and management plan with the patient and family    Kanika Bourgeois MD  .  Retina Service   Department of Ophthalmology and Visual Neurosciences   [facility]  Phone: [phone]   Fax: [phone]

## 2017-11-02 NOTE — Clinical Note
Keith Miranda I would like to get your opinion on this case: Patient with history of large submacula hemorrhage, right eye. 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 If you consider surgery, It would be OK with me if you do it Thanks madeline

## 2017-11-21 ENCOUNTER — OFFICE VISIT (OUTPATIENT)
Dept: OPHTHALMOLOGY | Facility: CLINIC | Age: 82
End: 2017-11-21
Attending: OPHTHALMOLOGY
Payer: MEDICARE

## 2017-11-21 DIAGNOSIS — H35.61 SUBRETINAL HEMORRHAGE OF RIGHT EYE: ICD-10-CM

## 2017-11-21 DIAGNOSIS — H35.61 SUBRETINAL HEMORRHAGE OF RIGHT EYE: Primary | ICD-10-CM

## 2017-11-21 DIAGNOSIS — H31.411: Primary | ICD-10-CM

## 2017-11-21 PROCEDURE — 99212 OFFICE O/P EST SF 10 MIN: CPT | Mod: 25,ZF

## 2017-11-21 PROCEDURE — 76512 OPH US DX B-SCAN: CPT | Mod: ZF | Performed by: OPHTHALMOLOGY

## 2017-11-21 ASSESSMENT — TONOMETRY
OS_IOP_MMHG: 22
IOP_METHOD: TONOPEN
OD_IOP_MMHG: 15

## 2017-11-21 ASSESSMENT — VISUAL ACUITY
OS_CC: 20/25
OS_CC+: -1
CORRECTION_TYPE: GLASSES
OD_CC: LP
METHOD: SNELLEN - LINEAR

## 2017-11-21 ASSESSMENT — CUP TO DISC RATIO
OS_RATIO: 0.7
OD_RATIO: 0.7

## 2017-11-21 ASSESSMENT — REFRACTION_WEARINGRX
OS_ADD: +2.50
OS_SPHERE: PLANO
OD_AXIS: 180
OD_SPHERE: -0.25
OD_CYLINDER: +0.50
OD_ADD: +2.50

## 2017-11-21 ASSESSMENT — EXTERNAL EXAM - LEFT EYE: OS_EXAM: NORMAL

## 2017-11-21 ASSESSMENT — CONF VISUAL FIELD
OS_NORMAL: 1
OD_INFERIOR_TEMPORAL_RESTRICTION: 1
OD_INFERIOR_NASAL_RESTRICTION: 1
OD_SUPERIOR_TEMPORAL_RESTRICTION: 1
OD_SUPERIOR_NASAL_RESTRICTION: 1
METHOD: COUNTING FINGERS

## 2017-11-21 ASSESSMENT — SLIT LAMP EXAM - LIDS
COMMENTS: NORMAL
COMMENTS: NORMAL

## 2017-11-21 ASSESSMENT — EXTERNAL EXAM - RIGHT EYE: OD_EXAM: NORMAL

## 2017-11-21 NOTE — PROGRESS NOTES
CC -   Hemorrhagic choroidals/SRH    INTERVAL HISTORY - Initial visit with me.  OD comfortable, no changes since saw  1 month ago    HPI -   Aurelio Graf is a  83 year old year-old patient with complicated history.  Initially referred by Dr. Vann for large SRH OD d/t AMD with severe vision loss, seen by Jose Vann.  Had PPV/FGx/TPA by  9/5/17.  Had hf-PDT 10/12/17 for persistent CNVM and SRH, developed severe vision loss next day per patient, seen 2 weeks later with no view d/t cataract, moderate hemorrhagic choroidals on U/S      PAST OCULAR SURGERY  PPV/FGx SF6/TPA  OD  9/5/17 ()    RETINAL IMAGING:  U/S B-scan  OD - hemorrhagic choroidals, mild, smaller than prior, ?mild VH, reflective membranes not likely retina, retina likely flat      ASSESSMENT & PLAN    0. POM#2.5 OD   - s/p PPV/FGx/TPA OD 9/5/17    - retina likely flat on U/S    1.  Hemorrhagic choroidals OD   - likely on U/S   - mild peripheral residual, improving   - observe   - will need CE/IOL OD to fully assess   - uncertain vision potential   - will seen  in 1 month    2.  Wet AMD OD   - had large SRH 9/2017   - s/p PPV/FGx/TPA with partial displacement   - s/p hf-PDT 10/12/17 with severe vision worsening per patient, hemorrhagic choroidals   - will need CE/IOL to assess   - uncertain potential    3.  Dry AMD OS   - AREDS / Amsler    4.  Cataract OD >> OS   - likely asymmetry d/t vitrectomy   - will need CE/IOL OD to assess retina   - plan after sees  in 1/2018 (patient traveling in December)      5.  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  I personally obtained the chief complaint(s) and history of present illness.  I confirmed and edited as necessary the review of systems, past medical/surgical  history, family history, social history, and examination findings as documented by others; and I examined the patient myself.  I personally reviewed the relevant tests, images, and reports as documented above.  I formulated and edited as necessary the assessment and plan and discussed the findings and management plan with the patient and family    Ruth Servin MD, PhD  , Vitreoretinal Surgery  Department of Ophthalmology  [facility]

## 2017-11-21 NOTE — NURSING NOTE
Chief Complaints and History of Present Illnesses   Patient presents with     Follow Up For     2 week follow up large submacular hemorrhage, right eye     HPI    Affected eye(s):  Right   Symptoms:     No floaters   No flashes   No redness   No Dryness         Do you have eye pain now?:  No      Comments:  Pt states vision is the same as last visit. No eye pain today. No flashers or floaters.    Scott AGUIRRE November 21, 2017 1:25 PM

## 2017-11-21 NOTE — MR AVS SNAPSHOT
After Visit Summary   11/21/2017    Aurelio Graf    MRN: [id number]           Patient Information     Date Of Birth          9/27/1934        Visit Information        Provider Department      11/21/2017 1:00 PM Ruth Servin MD Eye Clinic        Today's Diagnoses     Subretinal hemorrhage of right eye           Follow-ups after your visit        Your next 10 appointments already scheduled     Jan 04, 2018  2:00 PM CST   RETURN RETINA with Kanika Bourgeois MD   Eye Clinic ([facility])    [address]  [address]  [address]  [facility] [address]   [phone]              Who to contact     Please call your clinic at [phone] to:    Ask questions about your health    Make or cancel appointments    Discuss your medicines    Learn about your test results    Speak to your doctor   If you have compliments or concerns about an experience at your clinic, or if you wish to file a complaint, please contact [facility] Patient Relations at [phone] or email us at [email or web address]         Additional Information About Your Visit        Care EveryWhere ID     This is your Care EveryWhere ID. This could be used by other organizations to access your [place] medical records  [id number]         Blood Pressure from Last 3 Encounters:   09/05/17 147/68    Weight from Last 3 Encounters:   09/05/17 78.9 kg (174 lb)              We Performed the Following     Ultrasound B-scan OD (right eye)        Primary Care Provider Office Phone # Fax #    Anibal Neri [phone]       [address] [address]        Equal Access to Services     [facility] CampusSI[facility] : Hadii aad ku hadasho Soomaali, waaxda luqadaha, qaybta kaalmada adechun pizano . [facility] [phone].    ATENCIÓN: Si habla español, tiene a pena disposición servicios gratuitos de asistencia  lingüísticaRyan Fontaine al [phone].    We comply with applicable federal civil rights laws and [place] laws. We do not discriminate on the basis of race, color, national origin, age, disability, sex, sexual orientation, or gender identity.            Thank you!     Thank you for choosing EYE CLINIC  for your care. Our goal is always to provide you with excellent care. Hearing back from our patients is one way we can continue to improve our services. Please take a few minutes to complete the written survey that you may receive in the mail after your visit with us. Thank you!             Your Updated Medication List - Protect others around you: Learn how to safely use, store and throw away your medicines at www.disposemymeds.org.          This list is accurate as of: 11/21/17  3:10 PM.  Always use your most recent med list.                   Brand Name Dispense Instructions for use Diagnosis    Aflibercept 2 MG/0.05ML Soln injection    EYLEA    0.05 mL    0.05 mLs (2 mg) by Intravitreal route every 28 days for 12 doses    Exudative age-related macular degeneration, right eye, stage unspecified (H)       AMLODIPINE BESYLATE PO      Take 5 mg by mouth At Bedtime        ASPIRIN PO      Take 81 mg by mouth daily        ATORVASTATIN CALCIUM PO      Take 20 mg by mouth At Bedtime        bevacizumab 25 mg/mL intravitreal inj    AVASTIN    0.05 mL    0.05 mLs (1.25 mg) by Intravitreal route every 28 days for 12 doses    Exudative age-related macular degeneration, right eye, stage unspecified (H)       fish Oil 1200 MG capsule           hydrochlorothiazide 25 MG tablet    HYDRODIURIL     Take 25 mg by mouth daily        neomycin-polymixin-dexamethasone ophthalmic suspension    MAXITROL    5 mL    Apply 1 drop to eye 4 times daily Instill into operative eye(s) per physician instructions.    Subretinal hemorrhage of right eye       order for DME     1 Device    Equipment being ordered: face down positioning chair for 1 week  post op eye surgery    Retinal hemorrhage, right, Exudative age-related macular degeneration, right eye, stage unspecified (H)       PRESERVISION AREDS PO      Take 1 capsule by mouth 2 times daily        ranibizumab 0.5 MG/0.05ML Soln    LUCENTIS    0.05 mL    0.05 mLs (0.5 mg) by Intravitreal route every 28 days for 12 doses    Exudative age-related macular degeneration, right eye, stage unspecified (H)

## 2018-01-04 ENCOUNTER — OFFICE VISIT (OUTPATIENT)
Dept: OPHTHALMOLOGY | Facility: CLINIC | Age: 83
End: 2018-01-04
Attending: OPHTHALMOLOGY
Payer: MEDICARE

## 2018-01-04 DIAGNOSIS — H35.61 SUBRETINAL HEMORRHAGE OF RIGHT EYE: Primary | ICD-10-CM

## 2018-01-04 DIAGNOSIS — H35.61 SUBRETINAL HEMORRHAGE OF RIGHT EYE: ICD-10-CM

## 2018-01-04 PROCEDURE — 76512 OPH US DX B-SCAN: CPT | Mod: RT,ZF | Performed by: OPHTHALMOLOGY

## 2018-01-04 ASSESSMENT — VISUAL ACUITY
OD_SC: LP&P
OS_SC+: -2+1
METHOD: SNELLEN - LINEAR
OS_SC: 20/25

## 2018-01-04 ASSESSMENT — TONOMETRY
IOP_METHOD: TONOPEN
OD_IOP_MMHG: 19
OS_IOP_MMHG: 20

## 2018-01-04 ASSESSMENT — CUP TO DISC RATIO
OD_RATIO: 0.7
OS_RATIO: 0.7

## 2018-01-04 ASSESSMENT — SLIT LAMP EXAM - LIDS
COMMENTS: NORMAL
COMMENTS: NORMAL

## 2018-01-04 ASSESSMENT — CONF VISUAL FIELD
OD_INFERIOR_NASAL_RESTRICTION: 1
OD_SUPERIOR_TEMPORAL_RESTRICTION: 1
OD_SUPERIOR_NASAL_RESTRICTION: 1
OS_NORMAL: 1
OD_INFERIOR_TEMPORAL_RESTRICTION: 1

## 2018-01-04 ASSESSMENT — EXTERNAL EXAM - RIGHT EYE: OD_EXAM: NORMAL

## 2018-01-04 ASSESSMENT — EXTERNAL EXAM - LEFT EYE: OS_EXAM: NORMAL

## 2018-01-04 NOTE — Clinical Note
Keith partida, Thanks for seeing this patient previously. Choroidals stable. I agree he needs cataract surgery for better retina evaluation.  I think he would like to see you again to determine time for surgery I was thinking to consider with combo Cataract extraction intraocular lens/ vitrectomy surgery  once improvement of the choroidals to prevent re-bleeding. He will see you in a month.  Thanks again, madeline

## 2018-01-04 NOTE — PROGRESS NOTES
CC -   Hemorrhagic choroidals/SRH    INTERVAL HISTORY - no changes in vision. No flashes and floaters     HPI -  Aurelio Graf is a  83 year old year-old patient with complicated history.  Initially referred by Dr. Vann for large SRH OD d/t AMD with severe vision loss, seen by Jose & Edwar.  Had PPV/FGx/TPA by  9/5/17.  Had hf-PDT 10/12/17 for persistent CNVM and SRH, developed severe vision loss next day per patient, seen 2 weeks later with no view d/t cataract, moderate hemorrhagic choroidals on U/S    PAST OCULAR SURGERY  PPV/FGx SF6/TPA  OD  9/5/17 ()    RETINAL IMAGING:  U/S B-scan   1-4-18 OD - hemorrhagic choroidals stable compared to prior ultrasound 11.21.17, improved compared to 11.2.17; VH, reflective membranes not likely retina, retina appears attached    ASSESSMENT & PLAN    0 - s/p PPV/FGx/TPA OD 9/5/17    - retina likely flat on U/S    1.  Hemorrhagic choroidals OD   - likely on U/S   - mild peripheral residual, improving   - observe   - will need CE/IOL OD to fully assess   - uncertain vision potential   - will seen  in 1 month    2.  Wet AMD OD   - had large SRH 9/2017   - s/p PPV/FGx/TPA with partial displacement   - s/p hf-PDT 10/12/17 with severe vision worsening per patient, hemorrhagic choroidals   - will need CE/IOL for better retina evaluation. Could consider with combo vitrectomy surgery  once improvement of the choroidals to prevent re-bleeding    - uncertain visual potential     3.  Dry AMD OS   - AREDS / Amsler    4.  Cataract OD >> OS   - will plan for Cataract extraction intraocular lens once improvement of the choroidals     5.  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  I personally obtained the chief complaint(s) and history of present  illness.  I confirmed and edited as necessary the review of systems, past medical/surgical history, family history, social history, and examination findings as documented by others; and I examined the patient myself.  I personally reviewed the relevant tests, images, and reports as documented above.  I formulated and edited as necessary the assessment and plan and discussed the findings and management plan with the patient and family    Kanika Bourgeois MD  .  Retina Service   Department of Ophthalmology and Visual Neurosciences   [facility]  Phone: [phone]   Fax: [phone]

## 2018-01-04 NOTE — NURSING NOTE
Chief Complaints and History of Present Illnesses   Patient presents with     Follow Up For     6 week f/u Hemorrhagic choroidals/SRH     HPI    Affected eye(s):  Right   Symptoms:     Decreased vision (Comment: no changes per pt)   No floaters   No flashes   Tearing (Comment: patient notes alot of LE>RE)         Do you have eye pain now?:  No      Comments:    Patient notes he has no new concerns today.    Mayda Gonzalez January 4, 2018 2:19 PM

## 2018-01-04 NOTE — MR AVS SNAPSHOT
After Visit Summary   2018    Aurelio Graf    MRN: [id number]           Patient Information     Date Of Birth          1934        Visit Information        Provider Department      2018 2:00 PM Kanika Bourgeois MD [facility] Clinic        Today's Diagnoses     Subretinal hemorrhage of right eye           Follow-ups after your visit        Follow-up notes from your care team     Return in about 4 weeks (around 2018).      Your next 10 appointments already scheduled     2018  2:30 PM CST   RETURN RETINA with Ruth Servin MD   Eye Clinic ([facility])    [address]  [address]  [address]  [place] [address] [phone]              Who to contact     Please call your clinic at [phone] to:    Ask questions about your health    Make or cancel appointments    Discuss your medicines    Learn about your test results    Speak to your doctor   If you have compliments or concerns about an experience at your clinic, or if you wish to file a complaint, please contact [facility] Patient Relations at [phone] or email us at [email or web address]         Additional Information About Your Visit        MyChart Information     GridXhart is an electronic gateway that provides easy, online access to your medical records. With [facility], you can request a clinic appointment, read your test results, renew a prescription or communicate with your care team.     To sign up for SpumeNewst visit the website at [email or web address]   You will be asked to enter the access code listed below, as well as some personal information. Please follow the directions to create your username and password.     Your access code is: [email or web address]  Expires: 3/21/2018  6:31 AM     Your access code will  in 90 days. If you need help or a new code, please contact your [facility] or call  [phone] for assistance.        Care EveryWhere ID     This is your Care EveryWhere ID. This could be used by other organizations to access your [place] medical records  [id number]         Blood Pressure from Last 3 Encounters:   09/05/17 147/68    Weight from Last 3 Encounters:   09/05/17 78.9 kg (174 lb)              We Performed the Following     Ultrasound B-scan OD (right eye)        Primary Care Provider Office Phone # Fax #    Anibal Neri [phone]       [address] [address]        Equal Access to Services     [facility]: Hadii aad ku hadasho Soomaali, waaxda luqadaha, qaybta kaalmada adeegyada, chun rothman hayyousuf meneses . So [facility] [phone].    ATENCIÓN: Si habla español, tiene a pena disposición servicios gratuitos de asistencia lingüística. [facility] [phone].    We comply with applicable federal civil rights laws and [place] laws. We do not discriminate on the basis of race, color, national origin, age, disability, sex, sexual orientation, or gender identity.            Thank you!     Thank you for choosing EYE CLINIC  for your care. Our goal is always to provide you with excellent care. Hearing back from our patients is one way we can continue to improve our services. Please take a few minutes to complete the written survey that you may receive in the mail after your visit with us. Thank you!             Your Updated Medication List - Protect others around you: Learn how to safely use, store and throw away your medicines at www.disposemymeds.org.          This list is accurate as of: 1/4/18  4:13 PM.  Always use your most recent med list.                   Brand Name Dispense Instructions for use Diagnosis    Aflibercept 2 MG/0.05ML Soln injection    EYLEA    0.05 mL    0.05 mLs (2 mg) by Intravitreal route every 28 days for 12 doses    Exudative age-related macular degeneration, right eye, stage unspecified (H)        AMLODIPINE BESYLATE PO      Take 5 mg by mouth At Bedtime        ASPIRIN PO      Take 81 mg by mouth daily        ATORVASTATIN CALCIUM PO      Take 20 mg by mouth At Bedtime        bevacizumab 25 mg/mL intravitreal inj    AVASTIN    0.05 mL    0.05 mLs (1.25 mg) by Intravitreal route every 28 days for 12 doses    Exudative age-related macular degeneration, right eye, stage unspecified (H)       fish Oil 1200 MG capsule           hydrochlorothiazide 25 MG tablet    HYDRODIURIL     Take 25 mg by mouth daily        order for DME     1 Device    Equipment being ordered: face down positioning chair for 1 week post op eye surgery    Retinal hemorrhage, right, Exudative age-related macular degeneration, right eye, stage unspecified (H)       PRESERVISION AREDS PO      Take 1 capsule by mouth 2 times daily        ranibizumab 0.5 MG/0.05ML Soln    LUCENTIS    0.05 mL    0.05 mLs (0.5 mg) by Intravitreal route every 28 days for 12 doses    Exudative age-related macular degeneration, right eye, stage unspecified (H)

## 2018-02-06 ENCOUNTER — OFFICE VISIT (OUTPATIENT)
Dept: OPHTHALMOLOGY | Facility: CLINIC | Age: 83
End: 2018-02-06
Attending: OPHTHALMOLOGY
Payer: MEDICARE

## 2018-02-06 DIAGNOSIS — H31.411: Primary | ICD-10-CM

## 2018-02-06 DIAGNOSIS — H35.3230 EXUDATIVE AGE-RELATED MACULAR DEGENERATION, BILATERAL, STAGE UNSPECIFIED (H): ICD-10-CM

## 2018-02-06 PROCEDURE — G0463 HOSPITAL OUTPT CLINIC VISIT: HCPCS | Mod: ZF

## 2018-02-06 PROCEDURE — 76510 OPH US DX B-SCAN&QUAN A-SCAN: CPT | Mod: RT,ZF | Performed by: OPHTHALMOLOGY

## 2018-02-06 ASSESSMENT — TONOMETRY
OS_IOP_MMHG: 18
OD_IOP_MMHG: 10
IOP_METHOD: ICARE

## 2018-02-06 ASSESSMENT — VISUAL ACUITY
OS_CC+: +2
OD_SC: LP&P
METHOD: SNELLEN - LINEAR
OS_CC: 20/25
CORRECTION_TYPE: GLASSES

## 2018-02-06 ASSESSMENT — CONF VISUAL FIELD
OD_SUPERIOR_NASAL_RESTRICTION: 1
OD_SUPERIOR_TEMPORAL_RESTRICTION: 1
OD_INFERIOR_TEMPORAL_RESTRICTION: 1
OD_INFERIOR_NASAL_RESTRICTION: 1

## 2018-02-06 ASSESSMENT — SLIT LAMP EXAM - LIDS
COMMENTS: NORMAL
COMMENTS: NORMAL

## 2018-02-06 ASSESSMENT — CUP TO DISC RATIO
OS_RATIO: 0.7
OD_RATIO: 0.7

## 2018-02-06 ASSESSMENT — EXTERNAL EXAM - LEFT EYE: OS_EXAM: NORMAL

## 2018-02-06 ASSESSMENT — EXTERNAL EXAM - RIGHT EYE: OD_EXAM: NORMAL

## 2018-02-06 NOTE — NURSING NOTE
Chief Complaints and History of Present Illnesses   Patient presents with     Follow Up For     1 month f/u Subretinal hemorrhage of RE     HPI    Affected eye(s):  Right   Symptoms:     Decreased vision (Comment: no changes per pt)   No floaters   No flashes   No itching   No burning         Do you have eye pain now?:  No      Comments:      Mayda Gonzalez February 6, 2018 3:13 PM

## 2018-02-06 NOTE — Clinical Note
Told patient he could schedule surgery with you if he wanted to proceed wihtin next few weeks, otherwise to see you in clinic in the next 1-2 months

## 2018-02-06 NOTE — PROGRESS NOTES
CC -   Hemorrhagic choroidals/SRH    INTERVAL HISTORY - here for second opinion regarding cataract surgery of right eye.     HPI -  Aurelio Graf is a  83 year old year-old patient with complicated history. Initially referred by Dr. Vann for large SRH OD d/t AMD with severe vision loss, seen by [facility].  Had PPV/FGx/TPA by  9/5/17.  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 No noticeable vitreous debris.      ASSESSMENT & PLAN    1.  S/p PPV/FGx/TPA OD 9/5/17    - retina likely flat on U/S   - may have residual choroidals on U/S   - long persistence less c/w choroidals   - U/S findings may represent extensive SR scarring/fibrosis      2.  Wet AMD OD   - had large SRH 9/2017   - s/p PPV/FGx/TPA with partial displacement   - s/p hf-PDT 10/12/17 with severe vision worsening per patient, hemorrhagic choroidals     - unknown status, unknown visual prognosis,    - unknown benefit for future anti-VEGF   - needs CE/IOL to allow assessment   - persistent choroidal thickening/elevation on U/S may represent extensive SR fibrosis/CNVM   - very guarded prognosis   - would advise CE/IOL to allow clinical assessment    4.  Dry AMD OS   - AREDS / Amsler    5.  Cataract OD >> OS   - advise CE/IOL to allow posterior visualization    6.  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  I  personally obtained the chief complaint(s) and history of present illness.  I confirmed and edited as necessary the review of systems, past medical/surgical history, family history, social history, and examination findings as documented by others; and I examined the patient myself.  I personally reviewed the relevant tests, images, and reports as documented above.  I formulated and edited as necessary the assessment and plan and discussed the findings and management plan with the patient and family    Ruth Servin MD, PhD  , Vitreoretinal Surgery  Department of Ophthalmology  [facility]

## 2018-02-06 NOTE — MR AVS SNAPSHOT
After Visit Summary   2018    Aurelio Graf    MRN: [id number]           Patient Information     Date Of Birth          1934        Visit Information        Provider Department      2018 2:30 PM Ruth Servin MD Eye Clinic        Today's Diagnoses     Hemorrhagic choroidal detachment, right    -  1    Exudative age-related macular degeneration, bilateral, stage unspecified (H)           Follow-ups after your visit        Who to contact     Please call your clinic at [phone] to:    Ask questions about your health    Make or cancel appointments    Discuss your medicines    Learn about your test results    Speak to your doctor   If you have compliments or concerns about an experience at your clinic, or if you wish to file a complaint, please contact [facility] Patient Relations at [phone] or email us at [email or web address]         Additional Information About Your Visit        MyChart Information     MAPPING is an electronic gateway that provides easy, online access to your medical records. With MAPPING, you can request a clinic appointment, read your test results, renew a prescription or communicate with your care team.     To sign up for MAPPING visit the website at [email or web address]   You will be asked to enter the access code listed below, as well as some personal information. Please follow the directions to create your username and password.     Your access code is: [email or web address]  Expires: 3/21/2018  6:31 AM     Your access code will  in 90 days. If you need help or a new code, please contact your [facility] or call [phone] for assistance.        Care EveryWhere ID     This is your Care EveryWhere ID. This could be used by other organizations to access your [place] medical records  [id number]         Blood Pressure from Last 3 Encounters:   17 147/68    Weight from  Last 3 Encounters:   09/05/17 78.9 kg (174 lb)              We Performed the Following     US B-scan and A-scan OD (right eye)        Primary Care Provider Office Phone # Fax #    Anibal Neri [phone]       [address] [address]        Equal Access to Services     [facility]: Hadii aad ku hadasho Soomaali, waaxda luqadaha, qaybta kaalmada adeegyada, waxay idiin hayaan adeeg kharash la'aan . So [facility] [phone].    ATENCIÓN: Si habla español, tiene a pena disposición servicios gratuitos de asistencia lingüística. Llame al [phone].    We comply with applicable federal civil rights laws and [place] laws. We do not discriminate on the basis of race, color, national origin, age, disability, sex, sexual orientation, or gender identity.            Thank you!     Thank you for choosing EYE CLINIC  for your care. Our goal is always to provide you with excellent care. Hearing back from our patients is one way we can continue to improve our services. Please take a few minutes to complete the written survey that you may receive in the mail after your visit with us. Thank you!             Your Updated Medication List - Protect others around you: Learn how to safely use, store and throw away your medicines at www.disposemymeds.org.          This list is accurate as of 2/6/18  6:59 PM.  Always use your most recent med list.                   Brand Name Dispense Instructions for use Diagnosis    Aflibercept 2 MG/0.05ML Soln injection    EYLEA    0.05 mL    0.05 mLs (2 mg) by Intravitreal route every 28 days for 12 doses    Exudative age-related macular degeneration, right eye, stage unspecified (H)       AMLODIPINE BESYLATE PO      Take 5 mg by mouth At Bedtime        ASPIRIN PO      Take 81 mg by mouth daily        ATORVASTATIN CALCIUM PO      Take 20 mg by mouth At Bedtime        bevacizumab 25 mg/mL intravitreal inj    AVASTIN    0.05 mL    0.05 mLs (1.25 mg) by  Intravitreal route every 28 days for 12 doses    Exudative age-related macular degeneration, right eye, stage unspecified (H)       fish Oil 1200 MG capsule           hydrochlorothiazide 25 MG tablet    HYDRODIURIL     Take 25 mg by mouth daily        order for DME     1 Device    Equipment being ordered: face down positioning chair for 1 week post op eye surgery    Retinal hemorrhage, right, Exudative age-related macular degeneration, right eye, stage unspecified (H)       PRESERVISION AREDS PO      Take 1 capsule by mouth 2 times daily        ranibizumab 0.5 MG/0.05ML Soln    LUCENTIS    0.05 mL    0.05 mLs (0.5 mg) by Intravitreal route every 28 days for 12 doses    Exudative age-related macular degeneration, right eye, stage unspecified (H)

## 2018-02-08 DIAGNOSIS — H25.10 SENILE NUCLEAR SCLEROSIS: Primary | ICD-10-CM

## 2018-02-21 ENCOUNTER — OFFICE VISIT (OUTPATIENT)
Dept: OPHTHALMOLOGY | Facility: CLINIC | Age: 83
End: 2018-02-21
Attending: OPHTHALMOLOGY
Payer: MEDICARE

## 2018-02-21 DIAGNOSIS — H25.10 SENILE NUCLEAR SCLEROSIS: ICD-10-CM

## 2018-02-21 DIAGNOSIS — H31.411 HEMORRHAGIC CHOROIDAL DETACHMENT OF RIGHT EYE: Primary | ICD-10-CM

## 2018-02-21 PROCEDURE — G0463 HOSPITAL OUTPT CLINIC VISIT: HCPCS | Mod: ZF

## 2018-02-21 PROCEDURE — 76512 OPH US DX B-SCAN: CPT | Mod: ZF | Performed by: OPHTHALMOLOGY

## 2018-02-21 PROCEDURE — 76519 ECHO EXAM OF EYE: CPT | Mod: ZF | Performed by: OPHTHALMOLOGY

## 2018-02-21 ASSESSMENT — VISUAL ACUITY
METHOD: SNELLEN - LINEAR
OD_SC: LP
OS_CC: 20/25
OS_SC: 20/25

## 2018-02-21 ASSESSMENT — CONF VISUAL FIELD
OD_INFERIOR_NASAL_RESTRICTION: 1
OD_SUPERIOR_NASAL_RESTRICTION: 1
OD_SUPERIOR_TEMPORAL_RESTRICTION: 1
OD_INFERIOR_TEMPORAL_RESTRICTION: 1

## 2018-02-21 ASSESSMENT — EXTERNAL EXAM - LEFT EYE: OS_EXAM: NORMAL

## 2018-02-21 ASSESSMENT — EXTERNAL EXAM - RIGHT EYE: OD_EXAM: NORMAL

## 2018-02-21 ASSESSMENT — TONOMETRY
IOP_METHOD: TONOPEN
OS_IOP_MMHG: 20
OD_IOP_MMHG: 14

## 2018-02-21 ASSESSMENT — CUP TO DISC RATIO
OS_RATIO: 0.7
OD_RATIO: 0.7

## 2018-02-21 ASSESSMENT — SLIT LAMP EXAM - LIDS
COMMENTS: NORMAL
COMMENTS: NORMAL

## 2018-02-21 NOTE — PROGRESS NOTES
CC -   Hemorrhagic choroidals/SRH    INTERVAL HISTORY - here for second opinion regarding cataract surgery of right eye.     HPI -  Aurelio Graf is a  83 year old year-old patient with complicated history. Initially referred by Dr. Vann for large SRH OD d/t AMD with severe vision loss, seen by [facility].  Had PPV/FGx/TPA by  9/5/17.  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 No noticeable vitreous debris.    intraocular lens cals 2-21-18 AL right eye: 22.05; left eye: 23.91      ASSESSMENT & PLAN    1.  S/p PPV/FGx/TPA OD 9/5/17    - retina likely flat on U/S   - has residual choroidals on U/S   - long persistence less c/w choroidals   - U/S findings may represent extensive SR scarring/fibrosis; no view because of  Cataract     2.  Wet AMD OD   - had large SRH 9/2017   - s/p PPV/FGx/TPA with partial displacement   - s/p hf-PDT 10/12/17 with severe vision worsening per patient, hemorrhagic choroidals     - unknown status, unknown visual prognosis,    - unknown benefit for future anti-VEGF   - needs CE/IOL to allow assessment   - persistent choroidal thickening/elevation on U/S may represent extensive SR fibrosis/CNVM   - very guarded prognosis   - would advise CE/IOL to allow clinical assessment   - IOL measurements today, poor measurement right eye 2/2 to choroidal hemorrhage, will use left eye as reference     -  Plan for cataract surgery/ and possible Pars plana vitrectomy (PPV)/ choroidal drainage/silicone oil right eye   risks discussed 1:1000 risk of infection/bleed/loss of eye; 1:100 risk of RD and need for further  surgery.Patient aware of prolonged healing after retinal surgery (up to a year after surgery) as well as possibility of air/gas/SO  instillation into eye. Patient agreed to proceed with surgery.    4.  Dry AMD OS   - AREDS / Amsler    5.  Cataract OD >> OS   - advise CE/IOL to allow posterior visualization    6.  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  I personally obtained the chief complaint(s) and history of present illness.  I confirmed and edited as necessary the review of systems, past medical/surgical history, family history, social history, and examination findings as documented by others; and I examined the patient myself.  I personally reviewed the relevant tests, images, and reports as documented above.  I personally reviewed the ophthalmic test(s) associated with this encounter, agree with the interpretation(s) as documented by the resident/fellow, and have edited the corresponding report(s) as necessary.   I formulated and edited as necessary the assessment and plan and discussed the findings and management plan with the patient and family    Kanika Bourgeois MD  .  Retina Service   Department of Ophthalmology and Visual Neurosciences   [facility]  Phone: [phone]   Fax: [phone]

## 2018-02-21 NOTE — MR AVS SNAPSHOT
After Visit Summary   2018    Aurelio Graf    MRN: [id number]           Patient Information     Date Of Birth          1934        Visit Information        Provider Department      2018 2:30 PM Kanika Bourgeois MD Eye Clinic        Today's Diagnoses     Hemorrhagic choroidal detachment of right eye    -  1    Senile nuclear sclerosis           Follow-ups after your visit        Your next 10 appointments already scheduled     Mar 05, 2018   Procedure with Kanika Bourgeois MD   [facility] (--)    [address]  [facility] [address]   [phone]            Mar 06, 2018  2:45 PM CST   Post-Op with Sathya Nj MD   Eye Clinic ([facility])    [address] [address]   [phone]            Mar 15, 2018  2:45 PM CDT   Post-Op with Kanika Bourgeois MD   Eye Clinic ([facility])    [address] [address] [phone]              Who to contact     Please call your clinic at [phone] to:    Ask questions about your health    Make or cancel appointments    Discuss your medicines    Learn about your test results    Speak to your doctor            Additional Information About Your Visit        MyChart Information     T-PRO Solutionst is an electronic gateway that provides easy, online access to your medical records. With [employer], you can request a clinic appointment, read your test results, renew a prescription or communicate with your care team.     To sign up for T-PRO Solutionst visit the website at [email or web address]   You will be asked to enter the access code listed below, as well as some personal information. Please follow the directions to create your username and password.     Your access code is: [email or web address]  Expires: 3/21/2018  6:31 AM     Your access code will  in 90  days. If you need help or a new code, please contact your [facility] or call [phone] for assistance.        Care EveryWhere ID     This is your Care EveryWhere ID. This could be used by other organizations to access your [place] medical records  [id number]         Blood Pressure from Last 3 Encounters:   09/05/17 147/68    Weight from Last 3 Encounters:   09/05/17 78.9 kg (174 lb)              We Performed the Following     IOL Biometry w/ IOL calc OU (both eye)     Valeri-Operative Worksheet     Ultrasound B-scan OD (right eye)        Primary Care Provider Office Phone # Fax #    Anibal Neri [phone]       [address] [address]        Equal Access to Services     [employer] : Hadii mina chand hadasho Soomaali, waaxda luqadaha, qaybta kaalmada adeegyada, waxay idiin hayyousuf meneses . So [facility] [phone].    ATENCIÓN: Si habla español, tiene a pena disposición servicios gratuitos de asistencia lingüística. Llame al [phone].    We comply with applicable federal civil rights laws and [place] laws. We do not discriminate on the basis of race, color, national origin, age, disability, sex, sexual orientation, or gender identity.            Thank you!     Thank you for choosing EYE CLINIC  for your care. Our goal is always to provide you with excellent care. Hearing back from our patients is one way we can continue to improve our services. Please take a few minutes to complete the written survey that you may receive in the mail after your visit with us. Thank you!             Your Updated Medication List - Protect others around you: Learn how to safely use, store and throw away your medicines at www.disposemymeds.org.          This list is accurate as of 2/21/18 11:59 PM.  Always use your most recent med list.                   Brand Name Dispense Instructions for use Diagnosis    Aflibercept 2 MG/0.05ML Soln injection     EYLEA    0.05 mL    0.05 mLs (2 mg) by Intravitreal route every 28 days for 12 doses    Exudative age-related macular degeneration, right eye, stage unspecified (H)       AMLODIPINE BESYLATE PO      Take 5 mg by mouth At Bedtime        ASPIRIN PO      Take 81 mg by mouth daily        ATORVASTATIN CALCIUM PO      Take 20 mg by mouth At Bedtime        bevacizumab 25 mg/mL intravitreal inj    AVASTIN    0.05 mL    0.05 mLs (1.25 mg) by Intravitreal route every 28 days for 12 doses    Exudative age-related macular degeneration, right eye, stage unspecified (H)       fish Oil 1200 MG capsule           hydrochlorothiazide 25 MG tablet    HYDRODIURIL     Take 25 mg by mouth daily        order for DME     1 Device    Equipment being ordered: face down positioning chair for 1 week post op eye surgery    Retinal hemorrhage, right, Exudative age-related macular degeneration, right eye, stage unspecified (H)       PRESERVISION AREDS PO      Take 1 capsule by mouth 2 times daily        ranibizumab 0.5 MG/0.05ML Soln    LUCENTIS    0.05 mL    0.05 mLs (0.5 mg) by Intravitreal route every 28 days for 12 doses    Exudative age-related macular degeneration, right eye, stage unspecified (H)

## 2018-02-21 NOTE — NURSING NOTE
Chief Complaints and History of Present Illnesses   Patient presents with     Follow Up For     Senile nuclear sclerosis (Primary Dx)     HPI    Affected eye(s):  Left   Symptoms:     No blurred vision   No decreased vision   No distorted vision   Glare   No halos   No starbursts      Duration:  2 weeks   Frequency:  Constant       Do you have eye pain now?:  No      Comments:  Pt States va is the same since last visit.  Gage Hodge  2:38 PM February 21, 2018

## 2018-03-12 RX ORDER — CLOBETASOL PROPIONATE 0.5 MG/G
AEROSOL, FOAM TOPICAL
COMMUNITY

## 2018-03-13 ENCOUNTER — ANESTHESIA EVENT (OUTPATIENT)
Dept: SURGERY | Facility: CLINIC | Age: 83
End: 2018-03-13
Payer: MEDICARE

## 2018-03-13 ENCOUNTER — HOSPITAL ENCOUNTER (OUTPATIENT)
Facility: CLINIC | Age: 83
Discharge: HOME | End: 2018-03-13
Attending: OPHTHALMOLOGY | Admitting: OPHTHALMOLOGY
Payer: MEDICARE

## 2018-03-13 ENCOUNTER — ANESTHESIA (OUTPATIENT)
Dept: SURGERY | Facility: CLINIC | Age: 83
End: 2018-03-13
Payer: MEDICARE

## 2018-03-13 ENCOUNTER — SURGERY (OUTPATIENT)
Age: 83
End: 2018-03-13

## 2018-03-13 VITALS
RESPIRATION RATE: 16 BRPM | HEART RATE: 51 BPM | DIASTOLIC BLOOD PRESSURE: 84 MMHG | OXYGEN SATURATION: 98 % | TEMPERATURE: 96.5 F | BODY MASS INDEX: 25.77 KG/M2 | HEIGHT: 70 IN | WEIGHT: 180 LBS | SYSTOLIC BLOOD PRESSURE: 177 MMHG

## 2018-03-13 DIAGNOSIS — H31.301 CHOROIDAL HEMORRHAGE OF RIGHT EYE: ICD-10-CM

## 2018-03-13 DIAGNOSIS — H25.9 SENILE CATARACT OF RIGHT EYE, UNSPECIFIED AGE-RELATED CATARACT TYPE: Primary | ICD-10-CM

## 2018-03-13 PROCEDURE — 37000008 ZZH ANESTHESIA TECHNICAL FEE, 1ST 30 MIN: Performed by: OPHTHALMOLOGY

## 2018-03-13 PROCEDURE — 36000105 ZZH EYE SURGERY LEVEL 4 EA 15 ADDTL MIN: Performed by: OPHTHALMOLOGY

## 2018-03-13 PROCEDURE — 71000028 ZZH EYE RECOVERY PHASE 2 EACH 15 MINS: Performed by: OPHTHALMOLOGY

## 2018-03-13 PROCEDURE — A9270 NON-COVERED ITEM OR SERVICE: HCPCS | Mod: GY | Performed by: OPHTHALMOLOGY

## 2018-03-13 PROCEDURE — 25000128 H RX IP 250 OP 636: Performed by: OPHTHALMOLOGY

## 2018-03-13 PROCEDURE — 27210794 ZZH OR GENERAL SUPPLY STERILE: Performed by: OPHTHALMOLOGY

## 2018-03-13 PROCEDURE — 25000125 ZZHC RX 250: Performed by: NURSE ANESTHETIST, CERTIFIED REGISTERED

## 2018-03-13 PROCEDURE — 25000128 H RX IP 250 OP 636

## 2018-03-13 PROCEDURE — 37000009 ZZH ANESTHESIA TECHNICAL FEE, EACH ADDTL 15 MIN: Performed by: OPHTHALMOLOGY

## 2018-03-13 PROCEDURE — 25000125 ZZHC RX 250: Mod: GY | Performed by: OPHTHALMOLOGY

## 2018-03-13 PROCEDURE — 36000104 ZZH EYE SURGERY LEVEL 4 1ST 30 MIN: Performed by: OPHTHALMOLOGY

## 2018-03-13 PROCEDURE — 25000128 H RX IP 250 OP 636: Performed by: SURGERY

## 2018-03-13 PROCEDURE — 40000170 ZZH STATISTIC PRE-PROCEDURE ASSESSMENT II: Performed by: OPHTHALMOLOGY

## 2018-03-13 PROCEDURE — V2632 POST CHMBR INTRAOCULAR LENS: HCPCS | Performed by: OPHTHALMOLOGY

## 2018-03-13 PROCEDURE — 25000125 ZZHC RX 250: Performed by: SURGERY

## 2018-03-13 PROCEDURE — 25000125 ZZHC RX 250: Performed by: OPHTHALMOLOGY

## 2018-03-13 PROCEDURE — 25000128 H RX IP 250 OP 636: Performed by: NURSE ANESTHETIST, CERTIFIED REGISTERED

## 2018-03-13 DEVICE — EYE IMP IOL ALCON PCL SN60WF ACRYSOF IQ 20.0: Type: IMPLANTABLE DEVICE | Site: EYE | Status: FUNCTIONAL

## 2018-03-13 RX ORDER — DEXAMETHASONE SODIUM PHOSPHATE 4 MG/ML
INJECTION, SOLUTION INTRA-ARTICULAR; INTRALESIONAL; INTRAMUSCULAR; INTRAVENOUS; SOFT TISSUE PRN
Status: DISCONTINUED | OUTPATIENT
Start: 2018-03-13 | End: 2018-03-13 | Stop reason: HOSPADM

## 2018-03-13 RX ORDER — GLYCOPYRROLATE 0.2 MG/ML
INJECTION, SOLUTION INTRAMUSCULAR; INTRAVENOUS PRN
Status: DISCONTINUED | OUTPATIENT
Start: 2018-03-13 | End: 2018-03-13

## 2018-03-13 RX ORDER — TROPICAMIDE 10 MG/ML
1 SOLUTION/ DROPS OPHTHALMIC
Status: COMPLETED | OUTPATIENT
Start: 2018-03-13 | End: 2018-03-13

## 2018-03-13 RX ORDER — OFLOXACIN 3 MG/ML
1 SOLUTION/ DROPS OPHTHALMIC 4 TIMES DAILY
Qty: 5 ML | Refills: 0 | Status: SHIPPED | OUTPATIENT
Start: 2018-03-13

## 2018-03-13 RX ORDER — BALANCED SALT SOLUTION 6.4; .75; .48; .3; 3.9; 1.7 MG/ML; MG/ML; MG/ML; MG/ML; MG/ML; MG/ML
SOLUTION OPHTHALMIC PRN
Status: DISCONTINUED | OUTPATIENT
Start: 2018-03-13 | End: 2018-03-13 | Stop reason: HOSPADM

## 2018-03-13 RX ORDER — PHENYLEPHRINE HYDROCHLORIDE 25 MG/ML
1 SOLUTION/ DROPS OPHTHALMIC
Status: COMPLETED | OUTPATIENT
Start: 2018-03-13 | End: 2018-03-13

## 2018-03-13 RX ORDER — ONDANSETRON 2 MG/ML
INJECTION INTRAMUSCULAR; INTRAVENOUS PRN
Status: DISCONTINUED | OUTPATIENT
Start: 2018-03-13 | End: 2018-03-13

## 2018-03-13 RX ORDER — FENTANYL CITRATE 50 UG/ML
INJECTION, SOLUTION INTRAMUSCULAR; INTRAVENOUS PRN
Status: DISCONTINUED | OUTPATIENT
Start: 2018-03-13 | End: 2018-03-13

## 2018-03-13 RX ORDER — LIDOCAINE HYDROCHLORIDE 20 MG/ML
INJECTION, SOLUTION INFILTRATION; PERINEURAL PRN
Status: DISCONTINUED | OUTPATIENT
Start: 2018-03-13 | End: 2018-03-13

## 2018-03-13 RX ORDER — CYCLOPENTOLATE HYDROCHLORIDE 10 MG/ML
1 SOLUTION/ DROPS OPHTHALMIC
Status: COMPLETED | OUTPATIENT
Start: 2018-03-13 | End: 2018-03-13

## 2018-03-13 RX ORDER — PROPOFOL 10 MG/ML
INJECTION, EMULSION INTRAVENOUS PRN
Status: DISCONTINUED | OUTPATIENT
Start: 2018-03-13 | End: 2018-03-13

## 2018-03-13 RX ORDER — PREDNISOLONE ACETATE 10 MG/ML
1 SUSPENSION/ DROPS OPHTHALMIC 4 TIMES DAILY
Qty: 5 ML | Refills: 0 | Status: SHIPPED | OUTPATIENT
Start: 2018-03-13 | End: 2018-03-21

## 2018-03-13 RX ORDER — SODIUM CHLORIDE, SODIUM LACTATE, POTASSIUM CHLORIDE, CALCIUM CHLORIDE 600; 310; 30; 20 MG/100ML; MG/100ML; MG/100ML; MG/100ML
INJECTION, SOLUTION INTRAVENOUS CONTINUOUS
Status: DISCONTINUED | OUTPATIENT
Start: 2018-03-13 | End: 2018-03-13 | Stop reason: HOSPADM

## 2018-03-13 RX ORDER — KETOROLAC TROMETHAMINE 4 MG/ML
1 SOLUTION/ DROPS OPHTHALMIC 4 TIMES DAILY
Qty: 5 ML | Refills: 0 | Status: SHIPPED | OUTPATIENT
Start: 2018-03-13 | End: 2018-03-21

## 2018-03-13 RX ADMIN — PHENYLEPHRINE HYDROCHLORIDE 1 DROP: 2.5 SOLUTION/ DROPS OPHTHALMIC at 12:46

## 2018-03-13 RX ADMIN — DEXAMETHASONE SODIUM PHOSPHATE 2 MG: 4 INJECTION, SOLUTION INTRA-ARTICULAR; INTRALESIONAL; INTRAMUSCULAR; INTRAVENOUS; SOFT TISSUE at 14:39

## 2018-03-13 RX ADMIN — GLYCOPYRROLATE 0.2 MG: 0.2 INJECTION, SOLUTION INTRAMUSCULAR; INTRAVENOUS at 14:00

## 2018-03-13 RX ADMIN — TRYPAN BLUE 0.5 ML: 0.3 INJECTION, SOLUTION INTRAOCULAR; OPHTHALMIC at 14:05

## 2018-03-13 RX ADMIN — PROPOFOL 20 MG: 10 INJECTION, EMULSION INTRAVENOUS at 13:50

## 2018-03-13 RX ADMIN — PHENYLEPHRINE HYDROCHLORIDE 1 DROP: 2.5 SOLUTION/ DROPS OPHTHALMIC at 12:39

## 2018-03-13 RX ADMIN — ONDANSETRON 4 MG: 2 INJECTION INTRAMUSCULAR; INTRAVENOUS at 13:50

## 2018-03-13 RX ADMIN — LIDOCAINE HYDROCHLORIDE,EPINEPHRINE BITARTRATE 5 ML GIVEN: 20; .005 INJECTION, SOLUTION EPIDURAL; INFILTRATION; INTRACAUDAL; PERINEURAL at 13:58

## 2018-03-13 RX ADMIN — PHENYLEPHRINE HYDROCHLORIDE 1 DROP: 2.5 SOLUTION/ DROPS OPHTHALMIC at 12:33

## 2018-03-13 RX ADMIN — EPINEPHRINE 500 ML: 1 INJECTION, SOLUTION, CONCENTRATE INTRAVENOUS at 14:04

## 2018-03-13 RX ADMIN — Medication 1 APPLICATOR: at 14:04

## 2018-03-13 RX ADMIN — TROPICAMIDE 1 DROP: 10 SOLUTION/ DROPS OPHTHALMIC at 12:39

## 2018-03-13 RX ADMIN — Medication 5.5 MG: at 14:04

## 2018-03-13 RX ADMIN — LIDOCAINE HYDROCHLORIDE 0.5 ML: 10 INJECTION, SOLUTION EPIDURAL; INFILTRATION; INTRACAUDAL; PERINEURAL at 13:01

## 2018-03-13 RX ADMIN — CYCLOPENTOLATE HYDROCHLORIDE 1 DROP: 10 SOLUTION/ DROPS OPHTHALMIC at 12:46

## 2018-03-13 RX ADMIN — TROPICAMIDE 1 DROP: 10 SOLUTION/ DROPS OPHTHALMIC at 12:33

## 2018-03-13 RX ADMIN — TROPICAMIDE 1 DROP: 10 SOLUTION/ DROPS OPHTHALMIC at 12:46

## 2018-03-13 RX ADMIN — SODIUM CHLORIDE, POTASSIUM CHLORIDE, SODIUM LACTATE AND CALCIUM CHLORIDE: 600; 310; 30; 20 INJECTION, SOLUTION INTRAVENOUS at 13:00

## 2018-03-13 RX ADMIN — LIDOCAINE HYDROCHLORIDE 40 MG: 20 INJECTION, SOLUTION INFILTRATION; PERINEURAL at 13:50

## 2018-03-13 RX ADMIN — CYCLOPENTOLATE HYDROCHLORIDE 1 DROP: 10 SOLUTION/ DROPS OPHTHALMIC at 12:33

## 2018-03-13 RX ADMIN — FENTANYL CITRATE 25 MCG: 50 INJECTION, SOLUTION INTRAMUSCULAR; INTRAVENOUS at 13:50

## 2018-03-13 RX ADMIN — CYCLOPENTOLATE HYDROCHLORIDE 1 DROP: 10 SOLUTION/ DROPS OPHTHALMIC at 12:39

## 2018-03-13 RX ADMIN — BALANCED SALT SOLUTION 15 ML: 6.4; .75; .48; .3; 3.9; 1.7 SOLUTION OPHTHALMIC at 14:04

## 2018-03-13 RX ADMIN — DEXMEDETOMIDINE HYDROCHLORIDE 8 MCG: 100 INJECTION, SOLUTION INTRAVENOUS at 13:50

## 2018-03-13 RX ADMIN — NEOMYCIN SULFATE, POLYMYXIN B SULFATE, AND DEXAMETHASONE 1 TUBE: 3.5; 10000; 1 OINTMENT OPHTHALMIC at 14:40

## 2018-03-13 ASSESSMENT — LIFESTYLE VARIABLES: TOBACCO_USE: 0

## 2018-03-13 NOTE — IP AVS SNAPSHOT
[facility]    [address]    [place] [address]    Phone:  [phone]    Fax:  [phone]                                       After Visit Summary   3/13/2018    Aurelio Graf    MRN: [id number]           After Visit Summary Signature Page     I have received my discharge instructions, and my questions have been answered. I have discussed any challenges I see with this plan with the nurse or doctor.    ..........................................................................................................................................  Patient/Patient Representative Signature      ..........................................................................................................................................  Patient Representative Print Name and Relationship to Patient    ..................................................               ................................................  Date                                            Time    ..........................................................................................................................................  Reviewed by Signature/Title    ...................................................              ..............................................  Date                                                            Time

## 2018-03-13 NOTE — DISCHARGE INSTRUCTIONS
[facility]  Discharge Instruction    EyeSurgery [facility]    MD Kanika Moreau MD Joseph Terry, MD  Will I have pain?  Some discomfort is normal and expected following surgery. The first few days after surgery you may need to use prescription pain pills. Taking Tylenol (acetaminophen) regularly may help prevent pain.  Discomfort should gradually decrease and Tylenol should be sufficient to relieve pain. A foreign body sensation in the cornea of the eye is very common and caused by sutures placed at surgery. These sutures will go away in one to two weeks. If the pain worsens, you should call the doctor.  Do I need to wear an eye patch?  You do not need to wear an eye patch at home after the doctor has removed the patch on your first day after surgery. However, you may be more comfortable wearing a patch outside in the sun, when sleeping or napping, or in a armando, windy environment.  How much drainage should I have?  You may expect a moderate amount of drainage for a week. Gradually the drainage should decrease. The lids can be cleaned with a clean washcloth and gentle soap or diluted baby shampoo. Wipe the eyelids gently from the nose outward. Some blood in the tears is a normal finding.  Will there be swelling?  Some swelling is normal for about a week or two after which it will gradually decrease. Applying a cool compress, using a clean washcloth for 5 - 10 minutes several times a day may reduce the swelling and make you more comfortable. People may have some swelling of both eyes, especially if face down positioning is required. The white part of the eye may appear very red or bloody for a week or two. This may get worse a few days after surgery. Though the bright red appearance can look frightening, it is a normal finding early after surgery and will resolve in a few weeks.  Will I need to use eye drops?  You will be using several different kinds of eye drops or  ointment (salve) when you leave the hospital. The directions will be on each bottle or tube. The medication with the red top will keep your eye dilated and may make your eye more sensitive to light. Wearing sunglasses may help. The other medication is a combination antibiotic/steroid to prevent infection and promote healing.  Occasionally a third drop is used to control the pressure in your eye. A new bottle of artificial tears or lubricant ointment may be used along with your prescription eye drops after surgery. You will be using drops from four to eight weeks. Bring all eye medications (drops. ointments, or pills) with you  to each visit.   Always wash your hands before putting in the eye drops. You may wish to have someone else help you. Pull down on the lower lid and squeeze one drop from the bottle being careful not to touch the dropper to your eye or eyelid. One drop is sufficient, but another may be used if the first did not go into the eye. It is often easier to put in the drops if you are reclining or lying down. Wait five (5) minutes after the first drop before using the second drop to allow the medications to absorb into the eye.  How long will it take for my vision to improve?  Your vision should gradually improve, but it may take up to six months to regain your best vision.  You may notice floaters or double vision after your surgery. These symptoms usually will decrease with time. If the double vision is bothersome patching the eye may help.  If you notice a sudden worsening in your vision call your doctor.  Are there any physical restrictions after surgery?  Sleep with head up  Heavy lifting (greater than 50 pounds), swimming and contact sports should be avoided for about 3 to 4 weeks after surgery.  You may resume your usual sexual activities about one week after surgery.  When may I return to work or my normal activities?  Depending on the type or work, you may return to work within a few days. If  your work involves physical activity or driving, you will need to restrict your activities and remain home longer.  You may watch television, look at magazines, or work puzzles. Reading may be uncomfortable for several days, but using the eyes will not cause any damage.  You may go outside as usual. If conditions are windy or armando, wear an eye pad to avoid getting dust or dirt in the eye.  Can I travel?  You cannot fly in an airplane or drive into the mountains as long as the air or gas bubble remains in your eye.    Are there any driving restrictions?  Someone will need to drive you home from the hospital. Generally driving can be resumed in several days if you have good vision in your other eye. If you do not feel comfortable driving, do not drive! Your depth perception will be decreased so you will want to try driving during the day in light traffic until you feel comfortable driving. You should restrict your driving while you are taking prescription pain pills as they also can affect your judgment.  When can I shower and wash my hair?  You may shower or bathe when you get home, but avoid getting water in your eye. You may want someone to help you shampoo your hair at first.  You may shave, brush your teeth, or comb your hair. Do not use make-up, mascara, or creams/lotions around your eye for several weeks.  When will I see the doctor again?  Generally, you will be seen the first day after surgery and again 1-2 weeks later. If you have not received a return appointment before leaving the hospital, you should call our office during the business hours to arrange an appointment. If you will be seeing your local doctor instead of us, you will need to call that office to set up an appointment.  How do I reach a doctor if I have concerns?  One of our doctors is available by calling [facility] [phone]. Please try to call for routine questions and prescription refills during business  hours.  You should call your doctor if:  You notice a sudden decrease in your vision.  Have severe pain or pain increases rather than subsiding.    You notice a new black curtain over your eye that is not the gas bubble. If you have any of these symptoms, you may need to be examined.      Dr. Kanika Bourgeois  [facility]  [phone]  Post Operative Cataract Instructions      If you have a gauze eye patch on, please do not remove it until it is removed by your physician at your first post-operative visit.  You will start your eye drops the next day.      Wear the clear eye shield when sleeping for protection for 5 days.      Do not rub the operated eye.      Light sensitivity may be noticed. Sunglasses may be worn for comfort.      Some discomfort and irritation may be noticed. Acetaminophen (Tylenol) or Ibuprofen (Advil) may be taken for discomfort. If pain persists please call Dr. Bourgeois's office.      Keep the operated eye dry. You may wash your hair, bathe or shower, but keep the operated eye closed while doing so.       If you take glaucoma medications, bring them with you to the clinic on your first post operative visit.      Bring your prescribed eye drops with you to your scheduled post-operative appointment.      Use medication exactly as prescribed by your doctor. You may restart your regular home medications.       Call Dr. Bourgeois's office at [phone] if any of the following should occur:    - Any sudden vision changes, including decreased vision  - Nausea or severe headache  - Increase in pain not controlled  - Signs of infection (pus, increasing redness or tenderness)  - Severe sensitivity to light          [facility]est[facility] Discharge  Instructions  Anesthesia ([facility] Center)   Adult Discharge Instructions    For 24 hours after surgery    1. Get plenty of rest.  Make arrangements to have a responsible adult stay with you for at least 6 hours after  you leave the hospital.  2. Do not drive or use heavy equipment for 24 hours.    3. Do not drink alcohol for 24 hours.  4. Do not sign legal documents or make important decisions for 24 hours.  5. Avoid strenuous or risky activities. You may feel lightheaded.  If so, sit for a few minutes before standing.  Have someone help you get up.   6. Conscious sedation patients may resume a regular diet..  7. Any questions of medical nature, call your physician.        2/14/14

## 2018-03-13 NOTE — ANESTHESIA PREPROCEDURE EVALUATION
Anesthesia Evaluation     . Pt has had prior anesthetic.     No history of anesthetic complications          ROS/MED HX    ENT/Pulmonary:     (+)sleep apnea, uses CPAP , . .   (-) tobacco use and asthma   Neurologic:       Cardiovascular: Comment: S/p AAA repair and TAA repair    (+) hypertension-Peripheral Vascular Disease-- Other, --. : . . . :. .       METS/Exercise Tolerance:     Hematologic:         Musculoskeletal:         GI/Hepatic:     (+) GERD Asymptomatic on medication,       Renal/Genitourinary:     (+) chronic renal disease, type: CRI, BPH,       Endo:         Psychiatric:         Infectious Disease:         Malignancy:         Other:                     Physical Exam  Normal systems: pulmonary    Airway   Mallampati: II  TM distance: >3 FB  Neck ROM: full    Dental   Comment: native    Cardiovascular   Rhythm and rate: regular and normal      Pulmonary                     Anesthesia Plan      History & Physical Review  History and physical reviewed and following examination; no interval change.    ASA Status:  3 .    NPO Status:  > 8 hours    Plan for MAC   PONV prophylaxis:  Ondansetron (or other 5HT-3)       Postoperative Care      Consents  Anesthetic plan, risks, benefits and alternatives discussed with:  Patient..                          .

## 2018-03-13 NOTE — ANESTHESIA CARE TRANSFER NOTE
Patient: Aurelio Graf    Procedure(s):  RIGHT PHACOEMULSIFICATION CLEAR CORNEA WITH STANDARD INTRAOCULAR LENS IMPLANT, 25G PARSPLANA VITRECTOMY, POSTERIOR HYLOID PEELING, POSTERIOR CAPSULOTOMY - Wound Class: I-Clean    Diagnosis: RIGHT EYE CATARACT AND CHORDIAL HEMORROHAGE   Diagnosis Additional Information: No value filed.    Anesthesia Type:   MAC     Note:  Airway :Room Air  Patient transferred to:PACU  Comments: Transferred to [facility] recovery room in recliner with armrests up, spontaneous respirations, O2 saturation maintained on RA. All monitors and alarms on and functioning, clinically stable vital signs. Report given to recovery RN and questions answered. Patient alert and following verbal directions.Handoff Report: Identifed the Patient, Identified the Reponsible Provider, Reviewed the pertinent medical history, Discussed the surgical course, Reviewed Intra-OP anesthesia mangement and issues during anesthesia, Set expectations for post-procedure period and Allowed opportunity for questions and acknowledgement of understanding      Vitals: (Last set prior to Anesthesia Care Transfer)    CRNA VITALS  3/13/2018 1435 - 3/13/2018 1508      3/13/2018             Pulse: 55    Ht Rate: 54    SpO2: 100 %    Resp Rate (set): 10                Electronically Signed By: TOBY Andino CRNA  March 13, 2018  3:08 PM

## 2018-03-13 NOTE — IP AVS SNAPSHOT
MRN:[id number]                      After Visit Summary   3/13/2018    Aurelio Graf    MRN: [id number]           Thank you!     Thank you for choosing [facility] for your care. Our goal is always to provide you with excellent care. Hearing back from our patients is one way we can continue to improve our services. Please take a few minutes to complete the written survey that you may receive in the mail after you visit with us. Thank you!        Patient Information     Date Of Birth          9/27/1934        About your hospital stay     You were admitted on:  March 13, 2018 You last received care in the:  [facility]    You were discharged on:  March 13, 2018       Who to Call     For medical emergencies, please call 911.  For non-urgent questions about your medical care, please call your primary care provider or clinic, [phone]  For questions related to your surgery, please call your surgery clinic        Attending Provider     Provider Kanika Mack MD Ophthalmology       Primary Care Provider Office Phone # Fax #    Anibal Neri [phone]      After Care Instructions     Activity       Avoid strenuous activities the next several days.                  Your next 10 appointments already scheduled     Mar 15, 2018  2:45 PM CDT   Post-Op with Kanika Bourgeois MD   Eye Clinic ([facility])    [address] [address]   [phone]              Further instructions from your care team       [facility]  Discharge Instruction    EyeSurgery [facility]    MD Kanika Moreau MD Joseph Terry, MD  Will I have pain?  Some discomfort is normal and expected following surgery. The first few days after surgery you may need to use prescription pain pills. Taking Tylenol (acetaminophen) regularly may help prevent  pain.  Discomfort should gradually decrease and Tylenol should be sufficient to relieve pain. A foreign body sensation in the cornea of the eye is very common and caused by sutures placed at surgery. These sutures will go away in one to two weeks. If the pain worsens, you should call the doctor.  Do I need to wear an eye patch?  You do not need to wear an eye patch at home after the doctor has removed the patch on your first day after surgery. However, you may be more comfortable wearing a patch outside in the sun, when sleeping or napping, or in a armando, windy environment.  How much drainage should I have?  You may expect a moderate amount of drainage for a week. Gradually the drainage should decrease. The lids can be cleaned with a clean washcloth and gentle soap or diluted baby shampoo. Wipe the eyelids gently from the nose outward. Some blood in the tears is a normal finding.  Will there be swelling?  Some swelling is normal for about a week or two after which it will gradually decrease. Applying a cool compress, using a clean washcloth for 5 - 10 minutes several times a day may reduce the swelling and make you more comfortable. People may have some swelling of both eyes, especially if face down positioning is required. The white part of the eye may appear very red or bloody for a week or two. This may get worse a few days after surgery. Though the bright red appearance can look frightening, it is a normal finding early after surgery and will resolve in a few weeks.  Will I need to use eye drops?  You will be using several different kinds of eye drops or ointment (salve) when you leave the hospital. The directions will be on each bottle or tube. The medication with the red top will keep your eye dilated and may make your eye more sensitive to light. Wearing sunglasses may help. The other medication is a combination antibiotic/steroid to prevent infection and promote healing.  Occasionally a third drop is used to  control the pressure in your eye. A new bottle of artificial tears or lubricant ointment may be used along with your prescription eye drops after surgery. You will be using drops from four to eight weeks. Bring all eye medications (drops. ointments, or pills) with you  to each visit.   Always wash your hands before putting in the eye drops. You may wish to have someone else help you. Pull down on the lower lid and squeeze one drop from the bottle being careful not to touch the dropper to your eye or eyelid. One drop is sufficient, but another may be used if the first did not go into the eye. It is often easier to put in the drops if you are reclining or lying down. Wait five (5) minutes after the first drop before using the second drop to allow the medications to absorb into the eye.  How long will it take for my vision to improve?  Your vision should gradually improve, but it may take up to six months to regain your best vision.  You may notice floaters or double vision after your surgery. These symptoms usually will decrease with time. If the double vision is bothersome patching the eye may help.  If you notice a sudden worsening in your vision call your doctor.  Are there any physical restrictions after surgery?  Sleep with head up  Heavy lifting (greater than 50 pounds), swimming and contact sports should be avoided for about 3 to 4 weeks after surgery.  You may resume your usual sexual activities about one week after surgery.  When may I return to work or my normal activities?  Depending on the type or work, you may return to work within a few days. If your work involves physical activity or driving, you will need to restrict your activities and remain home longer.  You may watch television, look at magazines, or work puzzles. Reading may be uncomfortable for several days, but using the eyes will not cause any damage.  You may go outside as usual. If conditions are windy or armando, wear an eye pad to avoid  getting dust or dirt in the eye.  Can I travel?  You cannot fly in an airplane or drive into the mountains as long as the air or gas bubble remains in your eye.    Are there any driving restrictions?  Someone will need to drive you home from the hospital. Generally driving can be resumed in several days if you have good vision in your other eye. If you do not feel comfortable driving, do not drive! Your depth perception will be decreased so you will want to try driving during the day in light traffic until you feel comfortable driving. You should restrict your driving while you are taking prescription pain pills as they also can affect your judgment.  When can I shower and wash my hair?  You may shower or bathe when you get home, but avoid getting water in your eye. You may want someone to help you shampoo your hair at first.  You may shave, brush your teeth, or comb your hair. Do not use make-up, mascara, or creams/lotions around your eye for several weeks.  When will I see the doctor again?  Generally, you will be seen the first day after surgery and again 1-2 weeks later. If you have not received a return appointment before leaving the hospital, you should call our office during the business hours to arrange an appointment. If you will be seeing your local doctor instead of us, you will need to call that office to set up an appointment.  How do I reach a doctor if I have concerns?  One of our doctors is available by calling [facility] [phone]. Please try to call for routine questions and prescription refills during business hours.  You should call your doctor if:  You notice a sudden decrease in your vision.  Have severe pain or pain increases rather than subsiding.    You notice a new black curtain over your eye that is not the gas bubble. If you have any of these symptoms, you may need to be examined.      Dr. Kanika Bourgeois  [facility]  [phone]  Post Operative  Cataract Instructions      If you have a gauze eye patch on, please do not remove it until it is removed by your physician at your first post-operative visit.  You will start your eye drops the next day.      Wear the clear eye shield when sleeping for protection for 5 days.      Do not rub the operated eye.      Light sensitivity may be noticed. Sunglasses may be worn for comfort.      Some discomfort and irritation may be noticed. Acetaminophen (Tylenol) or Ibuprofen (Advil) may be taken for discomfort. If pain persists please call Dr. Bourgeois's office.      Keep the operated eye dry. You may wash your hair, bathe or shower, but keep the operated eye closed while doing so.       If you take glaucoma medications, bring them with you to the clinic on your first post operative visit.      Bring your prescribed eye drops with you to your scheduled post-operative appointment.      Use medication exactly as prescribed by your doctor. You may restart your regular home medications.       Call Dr. Bourgeois's office at [phone] if any of the following should occur:    - Any sudden vision changes, including decreased vision  - Nausea or severe headache  - Increase in pain not controlled  - Signs of infection (pus, increasing redness or tenderness)  - Severe sensitivity to light          [facility] Discharge  Instructions  Anesthesia (Eye Care Center)   Adult Discharge Instructions    For 24 hours after surgery    1. Get plenty of rest.  Make arrangements to have a responsible adult stay with you for at least 6 hours after you leave the hospital.  2. Do not drive or use heavy equipment for 24 hours.    3. Do not drink alcohol for 24 hours.  4. Do not sign legal documents or make important decisions for 24 hours.  5. Avoid strenuous or risky activities. You may feel lightheaded.  If so, sit for a few minutes before standing.  Have someone help you get up.   6. Conscious sedation  "patients may resume a regular diet..  7. Any questions of medical nature, call your physician.        14        Pending Results     No orders found from 3/11/2018 to 3/14/2018.            Admission Information     Date & Time Provider Department Dept. Phone    3/13/2018 Kanika Bourgeois MD [facility] [phone]      Your Vitals Were     Blood Pressure Pulse Temperature Respirations Height Weight    177/83 51 96.5  F (35.8  C) (Temporal) 16 1.778 m (5' 10\") 81.6 kg (180 lb)    Pulse Oximetry BMI (Body Mass Index)                98% 25.83 kg/m2          MyChart Information     [employer] lets you send messages to your doctor, view your test results, renew your prescriptions, schedule appointments and more. To sign up, go to [email or web address] . Click on \"Log in\" on the left side of the screen, which will take you to the Welcome page. Then click on \"Sign up Now\" on the right side of the page.     You will be asked to enter the access code listed below, as well as some personal information. Please follow the directions to create your username and password.     Your access code is: [email or web address]  Expires: 3/21/2018  7:31 AM     Your access code will  in 90 days. If you need help or a new code, please call your [facility] clinic or [phone].        Care EveryWhere ID     This is your Care EveryWhere ID. This could be used by other organizations to access your [facility] medical records  [id number]        Equal Access to Services     [facility] Hospital[facility] : Hadii mina De La Torre, waaxda luqadaha, qaybta kaalchun pappas . So [facility] [phone].    ATENCIÓN: Si habla español, tiene a pena disposición servicios gratuitos de asistencia lingüística. Llame al [phone].    We comply with applicable federal civil rights laws and [place] laws. We do not discriminate on the basis of race, color, national origin, age, disability, sex, sexual " orientation, or gender identity.               Review of your medicines      START taking        Dose / Directions    ketorolac tromethamine 0.4 % Soln ophthalmic solution   Commonly known as:  ACULAR-LS   Used for:  Senile cataract of right eye, unspecified age-related cataract type        Dose:  1 drop   Apply 1 drop to eye 4 times daily Instill into operative eye(s) per physician instructions.   Quantity:  5 mL   Refills:  0       ofloxacin 0.3 % ophthalmic solution   Commonly known as:  OCUFLOX   Used for:  Senile cataract of right eye, unspecified age-related cataract type        Dose:  1 drop   Apply 1 drop to eye 4 times daily Instill into operative eye(s) per physician instructions.   Quantity:  5 mL   Refills:  0       prednisoLONE acetate 1 % ophthalmic susp   Commonly known as:  PRED FORTE   Used for:  Senile cataract of right eye, unspecified age-related cataract type        Dose:  1 drop   Apply 1 drop to eye 4 times daily Instill into operative eye(s) per physician instructions.   Quantity:  5 mL   Refills:  0         CONTINUE these medicines which have NOT CHANGED        Dose / Directions    Aflibercept 2 MG/0.05ML Soln injection   Commonly known as:  EYLEA   Used for:  Exudative age-related macular degeneration, right eye, stage unspecified (H)        Dose:  2 mg   0.05 mLs (2 mg) by Intravitreal route every 28 days for 12 doses   Quantity:  0.05 mL   Refills:  11       AMLODIPINE BESYLATE PO        Dose:  5 mg   Take 5 mg by mouth At Bedtime   Refills:  0       ASPIRIN PO        Dose:  81 mg   Take 81 mg by mouth daily   Refills:  0       ATORVASTATIN CALCIUM PO        Dose:  20 mg   Take 20 mg by mouth At Bedtime   Refills:  0       clobetasol propionate 0.05 % Foam        Refills:  0       fish Oil 1200 MG capsule        Refills:  0       hydrochlorothiazide 25 MG tablet   Commonly known as:  HYDRODIURIL        Dose:  25 mg   Take 25 mg by mouth daily   Refills:  0       order for DME   Used for:   Retinal hemorrhage, right, Exudative age-related macular degeneration, right eye, stage unspecified (H)        Equipment being ordered: face down positioning chair for 1 week post op eye surgery   Quantity:  1 Device   Refills:  0       PRESERVISION AREDS PO        Dose:  1 capsule   Take 1 capsule by mouth 2 times daily   Refills:  0            Where to get your medicines      These medications were sent to [facility] [place], [place] - [address]  [address], [place] [address]     Phone:  [phone]     ketorolac tromethamine 0.4 % Soln ophthalmic solution    ofloxacin 0.3 % ophthalmic solution    prednisoLONE acetate 1 % ophthalmic susp                Protect others around you: Learn how to safely use, store and throw away your medicines at www.disposemymeds.org.             Medication List: This is a list of all your medications and when to take them. Check marks below indicate your daily home schedule. Keep this list as a reference.      Medications           Morning Afternoon Evening Bedtime As Needed    Aflibercept 2 MG/0.05ML Soln injection   Commonly known as:  EYLEA   0.05 mLs (2 mg) by Intravitreal route every 28 days for 12 doses                                AMLODIPINE BESYLATE PO   Take 5 mg by mouth At Bedtime                                ASPIRIN PO   Take 81 mg by mouth daily                                ATORVASTATIN CALCIUM PO   Take 20 mg by mouth At Bedtime                                clobetasol propionate 0.05 % Foam                                fish Oil 1200 MG capsule                                hydrochlorothiazide 25 MG tablet   Commonly known as:  HYDRODIURIL   Take 25 mg by mouth daily                                ketorolac tromethamine 0.4 % Soln ophthalmic solution   Commonly known as:  ACULAR-LS   Apply 1 drop to eye 4 times daily Instill into operative eye(s) per physician instructions.                                ofloxacin 0.3 %  ophthalmic solution   Commonly known as:  OCUFLOX   Apply 1 drop to eye 4 times daily Instill into operative eye(s) per physician instructions.                                order for DME   Equipment being ordered: face down positioning chair for 1 week post op eye surgery                                prednisoLONE acetate 1 % ophthalmic susp   Commonly known as:  PRED FORTE   Apply 1 drop to eye 4 times daily Instill into operative eye(s) per physician instructions.                                PRESERVISION AREDS PO   Take 1 capsule by mouth 2 times daily

## 2018-03-13 NOTE — OP NOTE
SURGEON:   MAURICIO GERARDO MD  Assistant: Sathya Nj MD    PREOPERATIVE DIAGNOSIS:  RIGHT eye   1. visually significant cataract    2. Choroidal hemorrhage, subretinal hemorrhage , vitreous hemorrahge     POSTOPERATIVE DIAGNOSIS: same  NAME OF THE PROCEDURE:   1. phacoemulsification w/ intraocular lens implantation [id number]. 20.0 diopters, serial N  [id number]  2. 25 gauge Pars plana vitrectomy (PPV), posterior hyloid peel, posterior capsulotomy     ANESTHESIA: Monitored anesthesia care and peribulbar block  COMPLICATIONS: none  INDICATIONS: Aurelio Graf is a 83 year old patient with diagnosis of visually significant cataract, subretinal , choroidal and  vitreous hemorrhage. The patient is here for surgical repair.      DESCRIPTION OF THE PROCEDURE:  The patient was taken to the operative holding area where intravenous sedation was administered and a perbulbar block consisting of a 1:1 mixture of 2%lidocaine and 0.75% marcaine with epinephrine and wydase, was administered to the operative eye with adequate anesthesia and akinesia.    The patient was then brought into the operating room where the operative eye was prepped and draped in the usual sterile surgical fashion for ophthalmic surgery, including the installation of one drop of 5% Povidone Iodine.  A sterile drape was placed over the face and body and a lid speculum was inserted.      With the use of a Supersharp blade and 0.12 forceps, a paracentesis was created at the 2 o'clock position, and viscoelastic was injected into the anterior chamber.  A 2.4 mm keratome was then used to construct a clear corneal incision at the 10 o'clock position. tripan blue was used to stain the capsule. The Anterior chamber was irrigated with Balanced Salt Solution. Next, Using Utrata forceps and cystotome needle, a continuous curvilinear capsulorrhexis was created and hydrodissection was undertaken with the use of BSS.  The nucleus was found to be freely mobile and then  removed by phacoemulsification using a stop and chop technique.  The remaining elements of cortex were then removed with irrigation/aspiration.  The IOL  was injected into the capsular bag and was rotated into a good position with a Sinskey hook. The remaining elements of viscoelastic were then removed with irrigation/aspiration. 10- nylon sutures was placed  at the wound incision and at paracentesis incision..    A 25 gauge infusion cannula was placed 3.5 mm posterior to the limbus inferotemporally. The infusion cannula was connected and its intravitreal location was verified by direct visualization. The infusion was turned on.   Two other 25 gauge trocars were placed 3.5 mm posterior to the limbus at 10:00 o'clock and 2:00 o'clock position. The vitrectomy handpiece and endoilluminator were placed in the eye. A posterior capsulotomy was performed.  A vitrectomy was performed.  Remnant of posterior hyloid face was noted inferiorly with old heme. The posterior hyloid face was raised and trimmed. No retinal brakes was noted. There was chronic subretinal heme under the macula. the optic nerve was pale and the vessels were very attenuated.  Because the peripheral choroidals were not significant elevated, the decision was not to drain them.  The trocars were removed. The sclerotomies were closed with 6-0 plain and 8-0 vicryl sutures.Subconjunctival injection of Dexamethasone and Ancef were administered.       The lid speculum was removed.  The eye was cleaned with wet and dry gauze. Maxitrol ointment was placed on the eye.  A patch and Guidry shield were placed over the eye.     The surgery was assisted by Dr. Sathya Nj, because no qualified resident was available on the day of the surgery. Due to the delicate and complex nature of this surgery, Dr. Sathya Nj was required. Dr. Sathya Nj assisted with Cataract extraction intraocular lens and vitrectomy. I was present for the entire surgery.

## 2018-03-13 NOTE — ANESTHESIA POSTPROCEDURE EVALUATION
Patient: Aurelio Graf    Procedure(s):  RIGHT PHACOEMULSIFICATION CLEAR CORNEA WITH STANDARD INTRAOCULAR LENS IMPLANT, 25G PARSPLANA VITRECTOMY, POSTERIOR HYLOID PEELING, POSTERIOR CAPSULOTOMY - Wound Class: I-Clean    Diagnosis:RIGHT EYE CATARACT AND CHORDIAL HEMORROHAGE   Diagnosis Additional Information: No value filed.    Anesthesia Type:  MAC    Note:  Anesthesia Post Evaluation    Patient location during evaluation: PACU  Patient participation: Able to fully participate in evaluation  Level of consciousness: awake  Pain management: adequate  Cardiovascular status: acceptable  Respiratory status: acceptable  Hydration status: acceptable  PONV: none     Anesthetic complications: None          Last vitals:  Vitals:    03/13/18 1236   BP: 177/67   Pulse: 51   Resp: 18   Temp: 35.8  C (96.5  F)   SpO2: 100%         Electronically Signed By: BLACK WORLEY  March 13, 2018  3:17 PM

## 2018-03-14 ENCOUNTER — OFFICE VISIT (OUTPATIENT)
Dept: OPHTHALMOLOGY | Facility: CLINIC | Age: 83
End: 2018-03-14
Attending: OPHTHALMOLOGY
Payer: MEDICARE

## 2018-03-14 DIAGNOSIS — Z48.810 AFTERCARE FOLLOWING SURGERY OF A SENSORY ORGAN: Primary | ICD-10-CM

## 2018-03-14 PROCEDURE — G0463 HOSPITAL OUTPT CLINIC VISIT: HCPCS | Mod: ZF

## 2018-03-14 ASSESSMENT — TONOMETRY
OS_IOP_MMHG: 18
OD_IOP_MMHG: 11
IOP_METHOD: TONOPEN

## 2018-03-14 ASSESSMENT — CUP TO DISC RATIO
OD_RATIO: 0.7
OS_RATIO: 0.7

## 2018-03-14 ASSESSMENT — EXTERNAL EXAM - RIGHT EYE: OD_EXAM: NORMAL

## 2018-03-14 ASSESSMENT — VISUAL ACUITY
OD_SC: HM
OS_SC: 20/20
METHOD: SNELLEN - LINEAR
OS_SC+: -3

## 2018-03-14 ASSESSMENT — EXTERNAL EXAM - LEFT EYE: OS_EXAM: NORMAL

## 2018-03-14 ASSESSMENT — SLIT LAMP EXAM - LIDS
COMMENTS: NORMAL
COMMENTS: NORMAL

## 2018-03-14 NOTE — NURSING NOTE
"Chief Complaints and History of Present Illnesses   Patient presents with     Post Op (Ophthalmology) Right Eye     S/P Combined IOL 1 day      HPI    Last Eye Exam:  2/21/18   Affected eye(s):  Right   Symptoms:     Blurred vision      Duration:  1 day   Frequency:  Constant       Do you have eye pain now?:  No      Comments:  Pt slept well last night. Some FBS \" Like my eyelid is rubbing against something \" Vision remains very poor RE. MD please review post operative instructions with pt today. SEVERO WILLIAM, COA 3:10 PM 03/14/2018                "

## 2018-03-14 NOTE — MR AVS SNAPSHOT
After Visit Summary   3/14/2018    Aurelio Graf    MRN: [id number]           Patient Information     Date Of Birth          1934        Visit Information        Provider Department      3/14/2018 3:00 PM Kanika Bourgeois MD [facility]         Follow-ups after your visit        Your next 10 appointments already scheduled     Mar 21, 2018  3:00 PM CDT   Post-Op with Kanika Bourgeois MD   Eye Clinic ([facility])    [address] [facility] [address] [place] [id number]   [phone]              Who to contact     Please call your clinic at [phone] to:    Ask questions about your health    Make or cancel appointments    Discuss your medicines    Learn about your test results    Speak to your doctor            Additional Information About Your Visit        MyChart Information     RiseSmartt is an electronic gateway that provides easy, online access to your medical records. With ClearDATA, you can request a clinic appointment, read your test results, renew a prescription or communicate with your care team.     To sign up for RiseSmartt visit the website at [email or web address]   You will be asked to enter the access code listed below, as well as some personal information. Please follow the directions to create your username and password.     Your access code is: [email or web address]  Expires: 3/21/2018  7:31 AM     Your access code will  in 90 days. If you need help or a new code, please contact your [facility] or call [phone] for assistance.        Care EveryWhere ID     This is your Care EveryWhere ID. This could be used by other organizations to access your [place] medical records  [id number]         Blood Pressure from Last 3 Encounters:   18 177/84   17 147/68    Weight from Last 3 Encounters:   18 81.6 kg (180 lb)   18 81.6 kg (180 lb)   17 78.9 kg (174 lb)               Today, you had the following     No orders found for display       Primary Care Provider Office Phone # Fax #    Anibal Neri [phone]       [address] [address]        Equal Access to Services     SMOOTH BADILLO : Hadnoreen mina chand hadmalao Soomaali, waaxda luqadaha, qaybta kaalmada adeegyada, chun mablein hayaan timoteoamando crespo gideon weiner. So [facility] [phone].    ATENCIÓN: Si habla español, tiene a pena disposición servicios gratuitos de asistencia lingüística. Llame al [phone].    We comply with applicable federal civil rights laws and [place] laws. We do not discriminate on the basis of race, color, national origin, age, disability, sex, sexual orientation, or gender identity.            Thank you!     Thank you for choosing EYE CLINIC  for your care. Our goal is always to provide you with excellent care. Hearing back from our patients is one way we can continue to improve our services. Please take a few minutes to complete the written survey that you may receive in the mail after your visit with us. Thank you!             Your Updated Medication List - Protect others around you: Learn how to safely use, store and throw away your medicines at www.disposemymeds.org.          This list is accurate as of 3/14/18  3:47 PM.  Always use your most recent med list.                   Brand Name Dispense Instructions for use Diagnosis    Aflibercept 2 MG/0.05ML Soln injection    EYLEA    0.05 mL    0.05 mLs (2 mg) by Intravitreal route every 28 days for 12 doses    Exudative age-related macular degeneration, right eye, stage unspecified (H)       AMLODIPINE BESYLATE PO      Take 5 mg by mouth At Bedtime        ASPIRIN PO      Take 81 mg by mouth daily        ATORVASTATIN CALCIUM PO      Take 20 mg by mouth At Bedtime        clobetasol propionate 0.05 % Foam           fish Oil 1200 MG capsule           hydrochlorothiazide 25 MG tablet    HYDRODIURIL     Take 25 mg by  mouth daily        ketorolac tromethamine 0.4 % Soln ophthalmic solution    ACULAR-LS    5 mL    Apply 1 drop to eye 4 times daily Instill into operative eye(s) per physician instructions.    Senile cataract of right eye, unspecified age-related cataract type       ofloxacin 0.3 % ophthalmic solution    OCUFLOX    5 mL    Apply 1 drop to eye 4 times daily Instill into operative eye(s) per physician instructions.    Senile cataract of right eye, unspecified age-related cataract type       order for DME     1 Device    Equipment being ordered: face down positioning chair for 1 week post op eye surgery    Retinal hemorrhage, right, Exudative age-related macular degeneration, right eye, stage unspecified (H)       prednisoLONE acetate 1 % ophthalmic susp    PRED FORTE    5 mL    Apply 1 drop to eye 4 times daily Instill into operative eye(s) per physician instructions.    Senile cataract of right eye, unspecified age-related cataract type       PRESERVISION AREDS PO      Take 1 capsule by mouth 2 times daily

## 2018-03-21 ENCOUNTER — OFFICE VISIT (OUTPATIENT)
Dept: OPHTHALMOLOGY | Facility: CLINIC | Age: 83
End: 2018-03-21
Attending: OPHTHALMOLOGY
Payer: MEDICARE

## 2018-03-21 DIAGNOSIS — H25.9 SENILE CATARACT OF RIGHT EYE, UNSPECIFIED AGE-RELATED CATARACT TYPE: ICD-10-CM

## 2018-03-21 PROCEDURE — G0463 HOSPITAL OUTPT CLINIC VISIT: HCPCS | Mod: ZF

## 2018-03-21 RX ORDER — NEOMYCIN SULFATE, POLYMYXIN B SULFATE, AND DEXAMETHASONE 3.5; 10000; 1 MG/G; [USP'U]/G; MG/G
1 OINTMENT OPHTHALMIC AT BEDTIME
COMMUNITY

## 2018-03-21 RX ORDER — PREDNISOLONE ACETATE 10 MG/ML
1 SUSPENSION/ DROPS OPHTHALMIC 3 TIMES DAILY
Qty: 5 ML | Refills: 1 | Status: SHIPPED | OUTPATIENT
Start: 2018-03-21

## 2018-03-21 RX ORDER — KETOROLAC TROMETHAMINE 4 MG/ML
1 SOLUTION/ DROPS OPHTHALMIC 3 TIMES DAILY
Qty: 5 ML | Refills: 0 | Status: SHIPPED | OUTPATIENT
Start: 2018-03-21

## 2018-03-21 ASSESSMENT — TONOMETRY
OD_IOP_MMHG: 18
OS_IOP_MMHG: 17
IOP_METHOD: TONOPEN

## 2018-03-21 ASSESSMENT — SLIT LAMP EXAM - LIDS
COMMENTS: NORMAL
COMMENTS: NORMAL

## 2018-03-21 ASSESSMENT — REFRACTION_WEARINGRX
OD_SPHERE: -0.25
OS_SPHERE: PLANO
OD_CYLINDER: +0.50
OD_ADD: +2.50
OS_ADD: +2.50
OD_AXIS: 180

## 2018-03-21 ASSESSMENT — CUP TO DISC RATIO
OS_RATIO: 0.7
OD_RATIO: 0.7

## 2018-03-21 ASSESSMENT — VISUAL ACUITY
OS_SC+: +2
OS_SC: 20/25
METHOD: SNELLEN - LINEAR

## 2018-03-21 ASSESSMENT — EXTERNAL EXAM - RIGHT EYE: OD_EXAM: NORMAL

## 2018-03-21 ASSESSMENT — EXTERNAL EXAM - LEFT EYE: OS_EXAM: NORMAL

## 2018-03-21 NOTE — PATIENT INSTRUCTIONS
Medications to operative eye  Predforte (pink) and ketorolac (gray) three times a day x 1 week then twice a day x 1 week then once a day x 1 week and stop    Stop ocuflox (tan)  maxitrol oint at night until finish     Follow up in 3 weeks

## 2018-03-21 NOTE — PROGRESS NOTES
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 and peripheral Retinal pigment epithelium changes   Limited [facility] right [facility] because of  Retina findings; improved peripheral vision   Doing well  K sutures in place    Plan:  Position:any  No heavy lifting   Guidry shield at all times  Retina detachment and endophthalmitis precautions were discussed with the patient and was asked to return if any of the those occur    Medications to operative eye  Predforte (pink) and ketorolac (gray) three times a day x 1 week then twice a day x 1 week then once a day x 1 week and stop  Stop ocuflox (tan)  maxitrol oint at night until finish     Follow up in 3 weeks.  ~~~~~~~~~~~~~~~~~~~~~~~~~~~~~~~~~~   Complete documentation of historical and exam elements from today's encounter can be found in the full encounter summary report (not reduplicated in this progress note).  I personally obtained the chief complaint(s) and history of present illness.  I confirmed and edited as necessary the review of systems, past medical/surgical history, family history, social history, and examination findings as documented by others; and I examined the patient myself.  I personally reviewed the relevant tests, images, and reports as documented above.  I formulated and edited as necessary the assessment and plan and discussed the findings and management plan with the patient and family    Kanika Bourgeois MD  .  Retina Service   Department of Ophthalmology and Visual Neurosciences   [facility]  Phone: [phone]   Fax: [phone]

## 2018-03-21 NOTE — NURSING NOTE
Chief Complaints and History of Present Illnesses   Patient presents with     Post Op (Ophthalmology) Right Eye     status post Cataract extraction intraocular lens, 25g Pars plana vitrectomy (PPV), posterior hyaloid peel, posterior capsulotomy for visually significant cataract and history of hemorrhagic choroidals  Right eye 3.13.18     HPI    Affected eye(s):  Right   Symptoms:     Flashes         Do you have eye pain now?:  No      Comments:  POP status post Cataract extraction intraocular lens, 25g Pars plana vitrectomy (PPV), posterior hyaloid peel, posterior capsulotomy for visually significant cataract and history of hemorrhagic choroidals  Right eye 3.13.18.  FELICIA Jorge 3:24 PM 03/21/2018

## 2018-03-21 NOTE — MR AVS SNAPSHOT
After Visit Summary   3/21/2018    Aurelio Graf    MRN: [id number]           Patient Information     Date Of Birth          1934        Visit Information        Provider Department      3/21/2018 3:00 PM Kanika Bourgeois MD [facility]        Today's Diagnoses     Senile cataract of right eye, unspecified age-related cataract type          Care Instructions    Medications to operative eye  Predforte (pink) and ketorolac (gray) three times a day x 1 week then twice a day x 1 week then once a day x 1 week and stop    Stop ocuflox (tan)  maxitrol oint at night until finish     Follow up in 3 weeks          Follow-ups after your visit        Follow-up notes from your care team     Return in about 3 weeks (around 2018).      Your next 10 appointments already scheduled     2018  3:00 PM CDT   RETURN RETINA with Kanika Bourgeois MD   Eye Clinic ([facility])    [address] [id number] [phone]              Who to contact     Please call your clinic at [phone] to:    Ask questions about your health    Make or cancel appointments    Discuss your medicines    Learn about your test results    Speak to your doctor            Additional Information About Your Visit        MyChart Information     "[employer]t is an electronic gateway that provides easy, online access to your medical records. With Glycobia, you can request a clinic appointment, read your test results, renew a prescription or communicate with your care team.     To sign up for "[employer]t visit the website at [email or web address]   You will be asked to enter the access code listed below, as well as some personal information. Please follow the directions to create your username and password.     Your access code is: [email or web address]  Expires: 2018  4:07 PM     Your access code will  in 90 days. If you need help or a new code,  please contact your [facility] or call [phone] for assistance.        Care EveryWhere ID     This is your Care EveryWhere ID. This could be used by other organizations to access your [place] medical records  [id number]         Blood Pressure from Last 3 Encounters:   03/13/18 177/84   09/05/17 147/68    Weight from Last 3 Encounters:   03/13/18 81.6 kg (180 lb)   03/02/18 81.6 kg (180 lb)   09/05/17 78.9 kg (174 lb)              Today, you had the following     No orders found for display         Today's Medication Changes          These changes are accurate as of 3/21/18  4:19 PM.  If you have any questions, ask your nurse or doctor.               These medicines have changed or have updated prescriptions.        Dose/Directions    ketorolac tromethamine 0.4 % Soln ophthalmic solution   Commonly known as:  ACULAR-LS   This may have changed:  when to take this   Used for:  Senile cataract of right eye, unspecified age-related cataract type   Changed by:  Kanika Bourgeois MD        Dose:  1 drop   Apply 1 drop to eye 3 times daily Instill into operative eye(s) per physician instructions.   Quantity:  5 mL   Refills:  0       prednisoLONE acetate 1 % ophthalmic susp   Commonly known as:  PRED FORTE   This may have changed:  when to take this   Used for:  Senile cataract of right eye, unspecified age-related cataract type   Changed by:  Kanika Bourgeois MD        Dose:  1 drop   Apply 1 drop to eye 3 times daily Instill into operative eye(s) per physician instructions.   Quantity:  5 mL   Refills:  1            Where to get your medicines      These medications [place] [facility] [place] [address]  [address], [facility] [address]     Phone:  [phone]     ketorolac tromethamine 0.4 % Soln ophthalmic solution    prednisoLONE acetate 1 % ophthalmic susp                Primary Care Provider Office Phone # Fax #    Anibal  MD Halle [phone]       [address] [address]        Equal Access to Services     SMOOTH BADILLO : Hadnoreen aad ku hadmalamilana Damarissameer, krystalda sukhjinderrasha, qajose miguelta kaalmada layne, chun mablein hayaan timoteoamando crespo laIrmayousuf weiner. So [facility] [phone].    ATENCIÓN: Si habla español, tiene a pena disposición servicios gratuitos de asistencia lingüística. Llame al [phone].    We comply with applicable federal civil rights laws and [place] laws. We do not discriminate on the basis of race, color, national origin, age, disability, sex, sexual orientation, or gender identity.            Thank you!     Thank you for choosing EYE CLINIC  for your care. Our goal is always to provide you with excellent care. Hearing back from our patients is one way we can continue to improve our services. Please take a few minutes to complete the written survey that you may receive in the mail after your visit with us. Thank you!             Your Updated Medication List - Protect others around you: Learn how to safely use, store and throw away your medicines at www.disposemymeds.org.          This list is accurate as of 3/21/18  4:19 PM.  Always use your most recent med list.                   Brand Name Dispense Instructions for use Diagnosis    Aflibercept 2 MG/0.05ML Soln injection    EYLEA    0.05 mL    0.05 mLs (2 mg) by Intravitreal route every 28 days for 12 doses    Exudative age-related macular degeneration, right eye, stage unspecified (H)       AMLODIPINE BESYLATE PO      Take 5 mg by mouth At Bedtime        ASPIRIN PO      Take 81 mg by mouth daily        ATORVASTATIN CALCIUM PO      Take 20 mg by mouth At Bedtime        clobetasol propionate 0.05 % Foam           fish Oil 1200 MG capsule           hydrochlorothiazide 25 MG tablet    HYDRODIURIL     Take 25 mg by mouth daily        ketorolac tromethamine 0.4 % Soln ophthalmic solution    ACULAR-LS    5 mL    Apply 1 drop to eye 3 times daily  Instill into operative eye(s) per physician instructions.    Senile cataract of right eye, unspecified age-related cataract type       neomycin-polymyxin-dexamethasone 3.5-58121-0.1 Oint ophthalmic ointment    MAXITROL     Place 1 Application into the right eye At Bedtime        ofloxacin 0.3 % ophthalmic solution    OCUFLOX    5 mL    Apply 1 drop to eye 4 times daily Instill into operative eye(s) per physician instructions.    Senile cataract of right eye, unspecified age-related cataract type       order for DME     1 Device    Equipment being ordered: face down positioning chair for 1 week post op eye surgery    Retinal hemorrhage, right, Exudative age-related macular degeneration, right eye, stage unspecified (H)       prednisoLONE acetate 1 % ophthalmic susp    PRED FORTE    5 mL    Apply 1 drop to eye 3 times daily Instill into operative eye(s) per physician instructions.    Senile cataract of right eye, unspecified age-related cataract type       PRESERVISION AREDS PO      Take 1 capsule by mouth 2 times daily

## 2018-04-11 ENCOUNTER — OFFICE VISIT (OUTPATIENT)
Dept: OPHTHALMOLOGY | Facility: CLINIC | Age: 83
End: 2018-04-11
Attending: OPHTHALMOLOGY
Payer: MEDICARE

## 2018-04-11 DIAGNOSIS — Z48.810 AFTERCARE FOLLOWING SURGERY OF A SENSORY ORGAN: Primary | ICD-10-CM

## 2018-04-11 DIAGNOSIS — H35.30 AMD (AGE-RELATED MACULAR DEGENERATION), BILATERAL: ICD-10-CM

## 2018-04-11 PROCEDURE — G0463 HOSPITAL OUTPT CLINIC VISIT: HCPCS | Mod: ZF

## 2018-04-11 ASSESSMENT — VISUAL ACUITY
OD_SC: HM
METHOD: SNELLEN - LINEAR
OS_SC+: +1
OS_SC: 20/25

## 2018-04-11 ASSESSMENT — SLIT LAMP EXAM - LIDS
COMMENTS: NORMAL
COMMENTS: NORMAL

## 2018-04-11 ASSESSMENT — TONOMETRY
IOP_METHOD: TONOPEN
OD_IOP_MMHG: 22
OS_IOP_MMHG: 23

## 2018-04-11 ASSESSMENT — EXTERNAL EXAM - LEFT EYE: OS_EXAM: NORMAL

## 2018-04-11 ASSESSMENT — CUP TO DISC RATIO
OD_RATIO: 0.7
OS_RATIO: 0.7

## 2018-04-11 ASSESSMENT — EXTERNAL EXAM - RIGHT EYE: OD_EXAM: NORMAL

## 2018-04-11 NOTE — NURSING NOTE
Chief Complaints and History of Present Illnesses   Patient presents with     Post Op (Ophthalmology) Right Eye     Senile cataract of right eye, unspecified age-related catara...     HPI    Affected eye(s):  Right   Symptoms:     Blurred vision   Decreased vision   Flashes (Comment: 2 X daily)      Duration:  3 weeks   Frequency:  Constant       Do you have eye pain now?:  No      Comments:  Pt stated vision has improved RE over the last 3 weeks.  Pred 1 x daily  Gage Hodge  4:19 PM April 11, 2018

## 2018-04-11 NOTE — MR AVS SNAPSHOT
After Visit Summary   2018    Aurelio Graf    MRN: [id number]           Patient Information     Date Of Birth          1934        Visit Information        Provider Department      2018 3:00 PM Kanika Bourgeois MD [facility]        Today's Diagnoses     Aftercare following surgery of a sensory organ    -  1    AMD (age-related macular degeneration), bilateral           Follow-ups after your visit        Who to contact     Please call your clinic at [phone] to:    Ask questions about your health    Make or cancel appointments    Discuss your medicines    Learn about your test results    Speak to your doctor            Additional Information About Your Visit        MyChart Information     iMemories is an electronic gateway that provides easy, online access to your medical records. With iMemories, you can request a clinic appointment, read your test results, renew a prescription or communicate with your care team.     To sign up for Obeot visit the website at [email or web address]   You will be asked to enter the access code listed below, as well as some personal information. Please follow the directions to create your username and password.     Your access code is: [email or web address]  Expires: 2018  4:07 PM     Your access code will  in 90 days. If you need help or a new code, please contact your [facility] or call [phone] for assistance.        Care EveryWhere ID     This is your Care EveryWhere ID. This could be used by other organizations to access your [place] medical records  [id number]         Blood Pressure from Last 3 Encounters:   18 177/84   17 147/68    Weight from Last 3 Encounters:   18 81.6 kg (180 lb)   18 81.6 kg (180 lb)   17 78.9 kg (174 lb)              Today, you had the following     No orders found for display       Primary Care Provider Office Phone # Fax #    Anibal  MD Halle [phone]       [address] [address]        Equal Access to Services     SMOOTH BADILLO : Hadnoreen aad ku hadmalamilana Damarissameer, krystalda sukhjinderdelmy, qajose miguelta kaalmada layne, chun mablein hayaan timoteoamando crespo laIrmayousuf weiner. So [facility] [phone].    ATENCIÓN: Si habla español, tiene a pena disposición servicios gratuitos de asistencia lingüística. Llame al [phone].    We comply with applicable federal civil rights laws and [place] laws. We do not discriminate on the basis of race, color, national origin, age, disability, sex, sexual orientation, or gender identity.            Thank you!     Thank you for choosing EYE CLINIC  for your care. Our goal is always to provide you with excellent care. Hearing back from our patients is one way we can continue to improve our services. Please take a few minutes to complete the written survey that you may receive in the mail after your visit with us. Thank you!             Your Updated Medication List - Protect others around you: Learn how to safely use, store and throw away your medicines at www.disposemymeds.org.          This list is accurate as of 4/11/18  6:38 PM.  Always use your most recent med list.                   Brand Name Dispense Instructions for use Diagnosis    Aflibercept 2 MG/0.05ML Soln injection    EYLEA    0.05 mL    0.05 mLs (2 mg) by Intravitreal route every 28 days for 12 doses    Exudative age-related macular degeneration, right eye, stage unspecified (H)       AMLODIPINE BESYLATE PO      Take 5 mg by mouth At Bedtime        ASPIRIN PO      Take 81 mg by mouth daily        ATORVASTATIN CALCIUM PO      Take 20 mg by mouth At Bedtime        clobetasol propionate 0.05 % Foam           fish Oil 1200 MG capsule           hydrochlorothiazide 25 MG tablet    HYDRODIURIL     Take 25 mg by mouth daily        ketorolac tromethamine 0.4 % Soln ophthalmic solution    ACULAR-LS    5 mL    Apply 1 drop to eye 3 times daily  Instill into operative eye(s) per physician instructions.    Senile cataract of right eye, unspecified age-related cataract type       neomycin-polymyxin-dexamethasone 3.5-33540-7.1 Oint ophthalmic ointment    MAXITROL     Place 1 Application into the right eye At Bedtime        ofloxacin 0.3 % ophthalmic solution    OCUFLOX    5 mL    Apply 1 drop to eye 4 times daily Instill into operative eye(s) per physician instructions.    Senile cataract of right eye, unspecified age-related cataract type       order for DME     1 Device    Equipment being ordered: face down positioning chair for 1 week post op eye surgery    Retinal hemorrhage, right, Exudative age-related macular degeneration, right eye, stage unspecified (H)       prednisoLONE acetate 1 % ophthalmic susp    PRED FORTE    5 mL    Apply 1 drop to eye 3 times daily Instill into operative eye(s) per physician instructions.    Senile cataract of right eye, unspecified age-related cataract type       PRESERVISION AREDS PO      Take 1 capsule by mouth 2 times daily

## 2018-04-11 NOTE — PROGRESS NOTES
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   -status post Avastin  injections   -s/p  25G PPV/AFx/ gas left eye and TPA for subretinal hemorrhage displacement 9/5/17   - status post photodynamic therapy (PDT) (spot size 5 mm- half fluence photodynamic therapy (PDT) 10.12.17   - decreased vision after photodynamic therapy (PDT) with recurrent heme with new choroidals on ultrasound - manage conservatively with observation   - now status post status post Cataract extraction intraocular lens, 25g Pars plana vitrectomy (PPV), posterior hyaloid peel, posterior capsulotomy for visually significant cataract and history of hemorrhagic choroidals  Right eye 3.13.18   - retina attached; [facility] limited because of  Large submacular scarring    2) Dry 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 Patient will follow up with local retina specialist in 3 months and [facility] next  available     Sathya Nj MD, PhD  Vitreoretinal Surgery Fellow    ~~~~~~~~~~~~~~~~~~~~~~~~~~~~~~~~~~   Complete documentation of historical and exam elements from today's encounter can be found in the full encounter summary report (not reduplicated in this progress note).  I personally obtained the chief complaint(s) and history of present illness.  I confirmed and edited as necessary the review of systems, past medical/surgical history, family history, social history, and examination findings as documented by others; and I examined the patient myself.  I personally reviewed the relevant tests, images, and reports as documented above.  I personally reviewed the ophthalmic test(s) associated with this encounter, agree with the interpretation(s) as documented by the resident/fellow, and have edited the corresponding report(s) as necessary.   I formulated and edited as necessary the assessment and plan and discussed the findings and management plan with the patient and family    Kanika Bourgeois MD  .  Retina Service   Department of Ophthalmology and Visual Neurosciences   [facility]  Phone: [phone]   Fax: [phone]

## 2018-04-11 NOTE — LETTER
4/11/2018       RE: Aurelio Graf  [address] [place] [address]     Dear Colleague,    Thank you for referring your patient, Aurelio Graf, to the EYE CLINIC at [facility]. Please see a copy of my visit note below.    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   -status post Avastin  injections   -s/p  25G PPV/AFx/ gas left eye and TPA for subretinal hemorrhage displacement 9/5/17   - status post photodynamic therapy (PDT) (spot size 5 mm- half fluence photodynamic therapy (PDT) 10.12.17   - decreased vision after photodynamic therapy (PDT) with recurrent heme with new choroidals on ultrasound - manage conservatively with observation   - now status post status post Cataract extraction intraocular lens, 25g Pars plana vitrectomy (PPV), posterior hyaloid peel, posterior capsulotomy for visually significant cataract and history of hemorrhagic choroidals  Right eye 3.13.18   - retina attached; [facility] limited because of  Large submacular scarring    2) 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 Patient will follow up with local retina specialist in 3 months and with [facility] next available     Sathya Nj MD, PhD  Vitreoretinal Surgery Fellow  ~~~~~~~~~~~~~~~~~~~~~~~~~~~~~~~~~~  Complete documentation of historical and exam elements from today's encounter can be found in the full encounter summary report (not reduplicated in this progress note).  I personally obtained the chief complaint(s) and history of present illness.  I confirmed and edited as necessary the review of systems, past medical/surgical history, family history, social history, and examination findings as documented by others; and I examined the patient myself.  I personally reviewed the relevant tests, images, and reports as documented above.  I personally reviewed the ophthalmic test(s) associated with this encounter, agree with the interpretation(s) as documented by the resident/fellow, and have edited the corresponding report(s) as necessary.   I formulated and edited as necessary the assessment and plan and discussed the findings and management plan with the patient and family. Kanika Bourgeois MD    Again, thank you for allowing me to participate in the care of your patient.      Sincerely,    Kanika Bourgeois MD     Retina Service   Department of Ophthalmology and Visual Neurosciences   [facility]  Phone:  [phone]   Fax:  [phone]

## 2021-02-21 NOTE — PROGRESS NOTES
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 and peripheral Retinal pigment epithelium changes   Limited [facility] right eye because of  Retina findings; improved peripheral vision   Doing well    Plan:  Position:any  No heavy lifting   Guidry shield at all times  Retina detachment and endophthalmitis precautions were discussed with the patient and was asked to return if any of the those occur    Medications to operative eye  Predforte  Ocuflox and ketorolac four times a day    maxitrol oint at night     Follow up in one week  ~~~~~~~~~~~~~~~~~~~~~~~~~~~~~~~~~~   Complete documentation of historical and exam elements from today's encounter can be found in the full encounter summary report (not reduplicated in this progress note).  I personally obtained the chief complaint(s) and history of present illness.  I confirmed and edited as necessary the review of systems, past medical/surgical history, family history, social history, and examination findings as documented by others; and I examined the patient myself.  I personally reviewed the relevant tests, images, and reports as documented above.  I formulated and edited as necessary the assessment and plan and discussed the findings and management plan with the patient and family    Kanika Bourgeois MD  .  Retina Service   Department of Ophthalmology and Visual Neurosciences   [facility]  Phone: [phone]   Fax: [phone]            same name as above

## 2022-01-01 ENCOUNTER — HOSPITAL ENCOUNTER (OUTPATIENT)
Facility: CLINIC | Age: 87
End: 2022-01-01
Attending: OPHTHALMOLOGY | Admitting: OPHTHALMOLOGY
Payer: MEDICARE

## 2022-01-01 DIAGNOSIS — Z11.59 ENCOUNTER FOR SCREENING FOR OTHER VIRAL DISEASES: Primary | ICD-10-CM

## (undated) DEVICE — APPLICATOR COTTON TIP 3" PKG OF 10 34831010

## (undated) DEVICE — EYE GAS SF6 PER USE/CC/ML 8065797001

## (undated) DEVICE — GLOVE PROTEXIS MICRO 6.5  2D73PM65

## (undated) DEVICE — EYE CANNULA SOFT TIP 25GA 8065149525

## (undated) DEVICE — LINEN TOWEL PACK X5 5464

## (undated) DEVICE — GLOVE PROTEXIS MICRO 8.5  2D73PM85

## (undated) DEVICE — GOWN LG DISP 9515

## (undated) DEVICE — SYR 05ML SLIP TIP W/O NDL

## (undated) DEVICE — GLOVE PROTEXIS MICRO 7.5  2D73PM75

## (undated) DEVICE — GLOVE PROTEXIS MICRO 7.0  2D73PM70

## (undated) DEVICE — Device

## (undated) DEVICE — EYE NDL RETROBULBAR 25GA 1.5" 581275

## (undated) DEVICE — TAPE MICROPORE 1"X1.5YD 1530S-1

## (undated) DEVICE — NDL 18GA 1.5" 305196

## (undated) DEVICE — SU VICRYL 8-0 TG160-8 18" J547G

## (undated) DEVICE — PACK VITRECTOMY PQ15VI57E

## (undated) DEVICE — EYE PACK 25GA PLUS CONSTELLATION PPK4253

## (undated) DEVICE — SU PLAIN 6-0 TG140-8 18" 1735G

## (undated) DEVICE — TAPE MICROPORE 2"X1.5YD 1530S-2

## (undated) DEVICE — EYE SHIELD PLASTIC

## (undated) DEVICE — EYE LENS VITRECTOMY MAGNIFYING ODVM

## (undated) DEVICE — EYE SOL BSS 500ML

## (undated) DEVICE — EYE PACK CUSTOM ANTERIOR 30DEG TIP CENTURION PPK6682-04

## (undated) DEVICE — DRAPE MICROSCOPE DRAPE  EYE DI40001

## (undated) DEVICE — EYE PACK BVI READYPAK KIT #2

## (undated) DEVICE — SYR 01ML 25GA 5/8" TBC

## (undated) DEVICE — SYR 05ML SLIP TIP W/O NDL 309647

## (undated) DEVICE — EYE KNIFE SLIT XSTAR VISITEC 2.8MM 45DEG 373728

## (undated) DEVICE — GLOVE PROTEXIS MICRO 6.0  2D73PM60

## (undated) DEVICE — EYE KIT AUTO GAS FOR CONSTELLATION 8065751014

## (undated) DEVICE — SUCTION CANISTER MEDIVAC LINER 1500ML W/LID 65651-515

## (undated) DEVICE — EYE PROBE LASER 25GA FLEX RFID 8065751114

## (undated) DEVICE — EYE SPONGE SPEAR WECK CEL 0008685

## (undated) DEVICE — SU ETHILON 10-0 CS160-6 12" 9000G

## (undated) DEVICE — TUBING SUCTION 12"X1/4" N612

## (undated) RX ORDER — FENTANYL CITRATE 50 UG/ML
INJECTION, SOLUTION INTRAMUSCULAR; INTRAVENOUS
Status: DISPENSED
Start: 2017-09-05

## (undated) RX ORDER — GLYCOPYRROLATE 0.2 MG/ML
INJECTION, SOLUTION INTRAMUSCULAR; INTRAVENOUS
Status: DISPENSED
Start: 2018-03-13

## (undated) RX ORDER — LIDOCAINE HYDROCHLORIDE 20 MG/ML
INJECTION, SOLUTION EPIDURAL; INFILTRATION; INTRACAUDAL; PERINEURAL
Status: DISPENSED
Start: 2018-03-13

## (undated) RX ORDER — TRIAMCINOLONE ACETONIDE 40 MG/ML
INJECTION, SUSPENSION INTRA-ARTICULAR; INTRAMUSCULAR
Status: DISPENSED
Start: 2017-09-05

## (undated) RX ORDER — ATROPINE SULFATE 10 MG/ML
SOLUTION/ DROPS OPHTHALMIC
Status: DISPENSED
Start: 2017-09-05

## (undated) RX ORDER — DEXAMETHASONE SODIUM PHOSPHATE 4 MG/ML
INJECTION, SOLUTION INTRA-ARTICULAR; INTRALESIONAL; INTRAMUSCULAR; INTRAVENOUS; SOFT TISSUE
Status: DISPENSED
Start: 2017-09-05

## (undated) RX ORDER — CEFAZOLIN SODIUM 500 MG/2.2ML
INJECTION, POWDER, FOR SOLUTION INTRAMUSCULAR; INTRAVENOUS
Status: DISPENSED
Start: 2018-03-13

## (undated) RX ORDER — CEFAZOLIN SODIUM 500 MG/2.2ML
INJECTION, POWDER, FOR SOLUTION INTRAMUSCULAR; INTRAVENOUS
Status: DISPENSED
Start: 2017-09-05

## (undated) RX ORDER — TETRACAINE HYDROCHLORIDE 5 MG/ML
SOLUTION OPHTHALMIC
Status: DISPENSED
Start: 2018-03-13

## (undated) RX ORDER — WATER 10 ML/10ML
INJECTION INTRAMUSCULAR; INTRAVENOUS; SUBCUTANEOUS
Status: DISPENSED
Start: 2017-09-05

## (undated) RX ORDER — DEXAMETHASONE SODIUM PHOSPHATE 4 MG/ML
INJECTION, SOLUTION INTRA-ARTICULAR; INTRALESIONAL; INTRAMUSCULAR; INTRAVENOUS; SOFT TISSUE
Status: DISPENSED
Start: 2018-03-13

## (undated) RX ORDER — ATROPINE SULFATE 10 MG/ML
SOLUTION/ DROPS OPHTHALMIC
Status: DISPENSED
Start: 2018-03-13

## (undated) RX ORDER — FENTANYL CITRATE 50 UG/ML
INJECTION, SOLUTION INTRAMUSCULAR; INTRAVENOUS
Status: DISPENSED
Start: 2018-03-13

## (undated) RX ORDER — ONDANSETRON 2 MG/ML
INJECTION INTRAMUSCULAR; INTRAVENOUS
Status: DISPENSED
Start: 2018-03-13

## (undated) RX ORDER — ONDANSETRON 2 MG/ML
INJECTION INTRAMUSCULAR; INTRAVENOUS
Status: DISPENSED
Start: 2017-09-05